# Patient Record
Sex: MALE | Race: WHITE | NOT HISPANIC OR LATINO | Employment: FULL TIME | URBAN - METROPOLITAN AREA
[De-identification: names, ages, dates, MRNs, and addresses within clinical notes are randomized per-mention and may not be internally consistent; named-entity substitution may affect disease eponyms.]

---

## 2018-12-18 ENCOUNTER — HOSPITAL ENCOUNTER (OUTPATIENT)
Dept: ULTRASOUND IMAGING | Facility: HOSPITAL | Age: 38
Discharge: HOME | End: 2018-12-18
Attending: SURGERY | Admitting: SURGERY

## 2018-12-18 ENCOUNTER — TRANSCRIBE ORDERS (OUTPATIENT)
Dept: LAB | Facility: HOSPITAL | Age: 38
End: 2018-12-18

## 2018-12-18 ENCOUNTER — APPOINTMENT (OUTPATIENT)
Dept: LAB | Facility: HOSPITAL | Age: 38
End: 2018-12-18

## 2018-12-18 ENCOUNTER — TRANSCRIBE ORDERS (OUTPATIENT)
Dept: ADMINISTRATIVE | Facility: HOSPITAL | Age: 38
End: 2018-12-18

## 2018-12-18 DIAGNOSIS — R10.11 ABDOMINAL PAIN, RIGHT UPPER QUADRANT: Primary | ICD-10-CM

## 2018-12-18 DIAGNOSIS — R10.11 ABDOMINAL PAIN, RIGHT UPPER QUADRANT: ICD-10-CM

## 2018-12-18 LAB
ALBUMIN SERPL-MCNC: 4.82 G/DL (ref 3.2–4.8)
ALBUMIN/GLOB SERPL: 2.6 G/DL (ref 1.5–2.5)
ALP SERPL-CCNC: 54 U/L (ref 25–100)
ALT SERPL W P-5'-P-CCNC: 89 U/L (ref 7–40)
ANION GAP SERPL CALCULATED.3IONS-SCNC: 8 MMOL/L (ref 3–11)
ARTICHOKE IGE QN: 146 MG/DL (ref 0–130)
AST SERPL-CCNC: 59 U/L (ref 0–33)
BILIRUB SERPL-MCNC: 0.8 MG/DL (ref 0.3–1.2)
BUN BLD-MCNC: 15 MG/DL (ref 9–23)
BUN/CREAT SERPL: 13.6 (ref 7–25)
CALCIUM SPEC-SCNC: 10 MG/DL (ref 8.7–10.4)
CHLORIDE SERPL-SCNC: 100 MMOL/L (ref 99–109)
CHOLEST SERPL-MCNC: 233 MG/DL (ref 0–200)
CO2 SERPL-SCNC: 29 MMOL/L (ref 20–31)
CREAT BLD-MCNC: 1.1 MG/DL (ref 0.6–1.3)
DEPRECATED RDW RBC AUTO: 42.8 FL (ref 37–54)
ERYTHROCYTE [DISTWIDTH] IN BLOOD BY AUTOMATED COUNT: 12.8 % (ref 11.3–14.5)
GFR SERPL CREATININE-BSD FRML MDRD: 74 ML/MIN/1.73
GLOBULIN UR ELPH-MCNC: 1.9 GM/DL
GLUCOSE BLD-MCNC: 86 MG/DL (ref 70–100)
HCT VFR BLD AUTO: 40 % (ref 38.9–50.9)
HDLC SERPL-MCNC: 41 MG/DL (ref 40–60)
HGB BLD-MCNC: 12.9 G/DL (ref 13.1–17.5)
LIPASE SERPL-CCNC: 52 U/L (ref 6–51)
MCH RBC QN AUTO: 29.4 PG (ref 27–31)
MCHC RBC AUTO-ENTMCNC: 32.3 G/DL (ref 32–36)
MCV RBC AUTO: 91.1 FL (ref 80–99)
PLATELET # BLD AUTO: 249 10*3/MM3 (ref 150–450)
PMV BLD AUTO: 11.6 FL (ref 6–12)
POTASSIUM BLD-SCNC: 4.5 MMOL/L (ref 3.5–5.5)
PROT SERPL-MCNC: 6.7 G/DL (ref 5.7–8.2)
RBC # BLD AUTO: 4.39 10*6/MM3 (ref 4.2–5.76)
SODIUM BLD-SCNC: 137 MMOL/L (ref 132–146)
TRIGL SERPL-MCNC: 448 MG/DL (ref 0–150)
WBC NRBC COR # BLD: 5.55 10*3/MM3 (ref 3.5–10.8)

## 2018-12-18 PROCEDURE — 83690 ASSAY OF LIPASE: CPT | Performed by: SURGERY

## 2018-12-18 PROCEDURE — 85027 COMPLETE CBC AUTOMATED: CPT | Performed by: SURGERY

## 2018-12-18 PROCEDURE — 76705 ECHO EXAM OF ABDOMEN: CPT

## 2018-12-18 PROCEDURE — 80053 COMPREHEN METABOLIC PANEL: CPT | Performed by: SURGERY

## 2018-12-18 PROCEDURE — 80061 LIPID PANEL: CPT | Performed by: SURGERY

## 2018-12-18 PROCEDURE — 36415 COLL VENOUS BLD VENIPUNCTURE: CPT | Performed by: SURGERY

## 2018-12-20 ENCOUNTER — TRANSCRIBE ORDERS (OUTPATIENT)
Dept: ADMINISTRATIVE | Facility: HOSPITAL | Age: 38
End: 2018-12-20

## 2018-12-20 DIAGNOSIS — D13.4 LIVER TUMOR (BENIGN): Primary | ICD-10-CM

## 2018-12-28 ENCOUNTER — HOSPITAL ENCOUNTER (OUTPATIENT)
Dept: MRI IMAGING | Facility: HOSPITAL | Age: 38
Discharge: HOME | End: 2018-12-28
Attending: SURGERY | Admitting: SURGERY

## 2018-12-28 DIAGNOSIS — D13.4 LIVER TUMOR (BENIGN): ICD-10-CM

## 2018-12-28 PROCEDURE — 74183 MRI ABD W/O CNTR FLWD CNTR: CPT

## 2018-12-28 PROCEDURE — 0 GADOBENATE DIMEGLUMINE 529 MG/ML SOLUTION: Performed by: SURGERY

## 2018-12-28 PROCEDURE — A9577 INJ MULTIHANCE: HCPCS | Performed by: SURGERY

## 2018-12-28 RX ADMIN — GADOBENATE DIMEGLUMINE 20 ML: 529 INJECTION, SOLUTION INTRAVENOUS at 09:50

## 2019-01-10 RX ORDER — ESCITALOPRAM OXALATE 10 MG/1
TABLET ORAL
Qty: 30 TABLET | Refills: 5 | Status: SHIPPED | OUTPATIENT
Start: 2019-01-10 | End: 2019-08-14 | Stop reason: SDUPTHER

## 2019-02-22 PROBLEM — R73.03 PREDIABETES: Status: ACTIVE | Noted: 2019-02-22

## 2019-02-22 PROBLEM — N18.30 CHRONIC KIDNEY DISEASE, STAGE 3: Status: ACTIVE | Noted: 2019-02-22

## 2019-02-22 PROBLEM — J30.9 AR (ALLERGIC RHINITIS): Status: ACTIVE | Noted: 2019-02-22

## 2019-02-22 PROBLEM — E66.9 OBESITY: Status: ACTIVE | Noted: 2019-02-22

## 2019-02-22 PROBLEM — E78.2 MIXED HYPERLIPIDEMIA: Status: ACTIVE | Noted: 2019-02-22

## 2019-02-22 PROBLEM — R10.9 ABDOMINAL PAIN: Status: ACTIVE | Noted: 2019-02-22

## 2019-02-22 PROBLEM — F41.0 PANIC DISORDER WITHOUT AGORAPHOBIA: Status: ACTIVE | Noted: 2019-02-22

## 2019-02-22 PROBLEM — I10 BENIGN ESSENTIAL HYPERTENSION: Status: ACTIVE | Noted: 2019-02-22

## 2019-02-22 PROBLEM — Z11.1 TUBERCULOSIS SCREENING: Status: ACTIVE | Noted: 2019-02-22

## 2019-02-25 ENCOUNTER — OFFICE VISIT (OUTPATIENT)
Dept: INTERNAL MEDICINE | Facility: CLINIC | Age: 39
End: 2019-02-25

## 2019-02-25 VITALS
HEART RATE: 62 BPM | BODY MASS INDEX: 35.36 KG/M2 | WEIGHT: 247 LBS | DIASTOLIC BLOOD PRESSURE: 80 MMHG | HEIGHT: 70 IN | SYSTOLIC BLOOD PRESSURE: 130 MMHG | TEMPERATURE: 97.7 F

## 2019-02-25 DIAGNOSIS — R06.83 SNORES: Primary | ICD-10-CM

## 2019-02-25 PROCEDURE — 99213 OFFICE O/P EST LOW 20 MIN: CPT | Performed by: PHYSICIAN ASSISTANT

## 2019-02-25 RX ORDER — LOSARTAN POTASSIUM 100 MG/1
100 TABLET ORAL
Refills: 11 | COMMUNITY
Start: 2019-02-07 | End: 2021-12-30 | Stop reason: SDUPTHER

## 2019-02-25 RX ORDER — FENOFIBRATE 54 MG/1
54 TABLET ORAL DAILY
COMMUNITY
Start: 2018-11-07 | End: 2019-06-06 | Stop reason: SDUPTHER

## 2019-02-25 RX ORDER — HEPATITIS A VACCINE, INACTIVATED 50 [IU]/ML
INJECTION, SUSPENSION INTRAMUSCULAR
Refills: 1 | COMMUNITY
Start: 2019-01-08 | End: 2019-05-07

## 2019-02-25 RX ORDER — TAMSULOSIN HYDROCHLORIDE 0.4 MG/1
CAPSULE ORAL
COMMUNITY
End: 2019-05-07

## 2019-02-25 RX ORDER — ENALAPRIL MALEATE 10 MG/1
10 TABLET ORAL DAILY
Refills: 3 | COMMUNITY
Start: 2019-02-07 | End: 2021-12-30 | Stop reason: SDUPTHER

## 2019-02-25 NOTE — PROGRESS NOTES
"Patient Care Team:  Ramana Bautista MD as PCP - General  Ramana Bautista MD as PCP - Family Medicine    Chief Complaint;:   Chief Complaint   Patient presents with   • Snoring     needs to be checked for sleep apnea        Subjective     HPI  38-year-old white male presents to the office today wanting a sleep evaluation.  He has snoring, fatigue.  He rarely wakes up in the morning feeling well rested.  He has gained some weight and is working on weight loss.  Past Medical History:   Diagnosis Date   • Hypertension        Social History     Socioeconomic History   • Marital status:      Spouse name: Not on file   • Number of children: Not on file   • Years of education: Not on file   • Highest education level: Not on file   Social Needs   • Financial resource strain: Not on file   • Food insecurity - worry: Not on file   • Food insecurity - inability: Not on file   • Transportation needs - medical: Not on file   • Transportation needs - non-medical: Not on file   Occupational History   • Not on file   Tobacco Use   • Smoking status: Never Smoker   • Smokeless tobacco: Never Used   Substance and Sexual Activity   • Alcohol use: Yes     Comment: socially   • Drug use: Defer   • Sexual activity: Defer   Other Topics Concern   • Not on file   Social History Narrative   • Not on file       Allergies   Allergen Reactions   • Shellfish-Derived Products Other (See Comments)     Edema       Review of Systems:     Review of Systems   Constitutional: Negative.    Respiratory: Negative.  Negative for apnea.    Cardiovascular: Negative.        Vital Signs  Vitals:    02/25/19 0952   BP: 130/80   BP Location: Left arm   Patient Position: Sitting   Cuff Size: Adult   Pulse: 62   Temp: 97.7 °F (36.5 °C)   TempSrc: Temporal   Weight: 112 kg (247 lb)  Comment: without shoes   Height: 177.8 cm (70\")   PainSc: 0-No pain         Current Outpatient Medications:   •  enalapril (VASOTEC) 10 MG tablet, , Disp: , Rfl: " 3  •  escitalopram (LEXAPRO) 10 MG tablet, TAKE 1 TABLET BY MOUTH EVERY DAY, Disp: 30 tablet, Rfl: 5  •  fenofibrate (TRICOR) 54 MG tablet, Take  by mouth., Disp: , Rfl:   •  losartan (COZAAR) 100 MG tablet, , Disp: , Rfl: 11  •  MULTIPLE VITAMIN PO, Multiple Vitamin capsule  Daily, Disp: , Rfl:   •  tamsulosin (FLOMAX) 0.4 MG capsule 24 hr capsule, tamsulosin 0.4 mg capsule  Take 1 capsule every day by oral route., Disp: , Rfl:   •  VAQTA 50 UNIT/ML injection, , Disp: , Rfl: 1    Physical Exam:    Physical Exam   Constitutional: He is oriented to person, place, and time. He appears well-developed and well-nourished.   Neck: Normal range of motion. Neck supple.   Cardiovascular: Normal rate, regular rhythm and normal heart sounds.   Pulmonary/Chest: Effort normal and breath sounds normal.   Neurological: He is alert and oriented to person, place, and time.   Nursing note and vitals reviewed.      Procedures      Assessment/Plan   Problem List Items Addressed This Visit     None      Visit Diagnoses     Snores    -  Primary    Relevant Orders    Ambulatory Referral to Sleep Medicine        Patient Instructions   Referred to [facility] MD      Plan of care reviewed with patient at the conclusion of today's visit. Education was provided regarding diagnosis, management, and any prescribed or recommended OTC medications.Patient verbalizes understanding of and agreement with management plan.     Mari Shelley PA-C

## 2019-02-25 NOTE — PROGRESS NOTES
Patient Care Team:  Ramana Bautista MD as PCP - General  Ramana Bautista MD as PCP - Family Medicine    Chief Complaint;:   Chief Complaint   Patient presents with   • Snoring     needs to be checked for sleep apnea        Subjective     HPI    Past Medical History:   Diagnosis Date   • Hypertension        History reviewed. No pertinent surgical history.    Family History   Problem Relation Age of Onset   • Stroke Other    • Hypertension Other        Social History     Socioeconomic History   • Marital status:      Spouse name: Not on file   • Number of children: Not on file   • Years of education: Not on file   • Highest education level: Not on file   Social Needs   • Financial resource strain: Not on file   • Food insecurity - worry: Not on file   • Food insecurity - inability: Not on file   • Transportation needs - medical: Not on file   • Transportation needs - non-medical: Not on file   Occupational History   • Not on file   Tobacco Use   • Smoking status: Never Smoker   • Smokeless tobacco: Never Used   Substance and Sexual Activity   • Alcohol use: Yes     Comment: socially   • Drug use: Defer   • Sexual activity: Defer   Other Topics Concern   • Not on file   Social History Narrative   • Not on file       Allergies   Allergen Reactions   • Shellfish-Derived Products Other (See Comments)     Edema       Review of Systems:     Review of Systems   Constitutional: Positive for fatigue. Negative for chills and fever.   HENT: Negative for congestion, ear pain and sinus pressure.    Respiratory: Negative for cough, chest tightness, shortness of breath and wheezing.    Cardiovascular: Negative for chest pain and palpitations.   Gastrointestinal: Negative for abdominal pain, blood in stool and constipation.   Skin: Negative for color change.   Allergic/Immunologic: Negative for environmental allergies.   Neurological: Negative for dizziness, speech difficulty and headache.  "  Psychiatric/Behavioral: Negative for decreased concentration. The patient is not nervous/anxious.        Vital Signs  Vitals:    02/25/19 0952   BP: 130/80   BP Location: Left arm   Patient Position: Sitting   Cuff Size: Adult   Pulse: 62   Temp: 97.7 °F (36.5 °C)   TempSrc: Temporal   Weight: 112 kg (247 lb)  Comment: without shoes   Height: 177.8 cm (70\")   PainSc: 0-No pain         Current Outpatient Medications:   •  enalapril (VASOTEC) 10 MG tablet, , Disp: , Rfl: 3  •  escitalopram (LEXAPRO) 10 MG tablet, TAKE 1 TABLET BY MOUTH EVERY DAY, Disp: 30 tablet, Rfl: 5  •  fenofibrate (TRICOR) 54 MG tablet, Take  by mouth., Disp: , Rfl:   •  losartan (COZAAR) 100 MG tablet, , Disp: , Rfl: 11  •  MULTIPLE VITAMIN PO, Multiple Vitamin capsule  Daily, Disp: , Rfl:   •  tamsulosin (FLOMAX) 0.4 MG capsule 24 hr capsule, tamsulosin 0.4 mg capsule  Take 1 capsule every day by oral route., Disp: , Rfl:   •  VAQTA 50 UNIT/ML injection, , Disp: , Rfl: 1    Physical Exam:    Physical Exam    Procedures     Results Review:    I reviewed the patient's new clinical results.    Assessment/Plan   Problem List Items Addressed This Visit     None        There are no Patient Instructions on file for this visit.    Plan of care reviewed with patient at the conclusion of today's visit. Education was provided regarding diagnosis, management, and any prescribed or recommended OTC medications.Patient verbalizes understanding of and agreement with management plan.     Emma Levi MA  "

## 2019-02-27 ENCOUNTER — CONSULT (OUTPATIENT)
Dept: SLEEP MEDICINE | Facility: HOSPITAL | Age: 39
End: 2019-02-27

## 2019-02-27 VITALS
DIASTOLIC BLOOD PRESSURE: 83 MMHG | OXYGEN SATURATION: 97 % | HEART RATE: 74 BPM | WEIGHT: 249.2 LBS | BODY MASS INDEX: 35.68 KG/M2 | HEIGHT: 70 IN | SYSTOLIC BLOOD PRESSURE: 154 MMHG

## 2019-02-27 DIAGNOSIS — R06.83 SNORING: Primary | ICD-10-CM

## 2019-02-27 DIAGNOSIS — E66.09 CLASS 2 OBESITY DUE TO EXCESS CALORIES WITHOUT SERIOUS COMORBIDITY WITH BODY MASS INDEX (BMI) OF 35.0 TO 35.9 IN ADULT: ICD-10-CM

## 2019-02-27 DIAGNOSIS — G47.33 OBSTRUCTIVE SLEEP APNEA, ADULT: ICD-10-CM

## 2019-02-27 PROCEDURE — 99203 OFFICE O/P NEW LOW 30 MIN: CPT | Performed by: INTERNAL MEDICINE

## 2019-02-28 NOTE — PROGRESS NOTES
Subjective   Fili Mcclellan is a 38 y.o. male is being seen for consultation today at the request of Mari Shelley PA-C for the evaluation of snoring and restorative sleep.  He is also seen by Dr. Steve Bautista    History of Present Illness  Patient states she had a history of snoring and is gained weight recently.  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  He is referred here for further evaluation.  He has a history of snoring loudly for about 10 years.  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  He is been on blood pressure medicines for many years.  He is not been noted to have apneas.  He rarely awakens gasping for breath.  He says he sometimes rested in the mornings although occasionally not.  He denies any morning headaches.  He sleepy if sitting quietly during the day.  He gets sleepy driving.  He's been told he had a deviated septum and has broken his nose playing sports.    He says he usually sleeps on his stomach and doesn't feel like he can sleep on his back.  He does have trouble breathing through his nose both day and night.  He denies any reflux symptoms he denies hypnagogic hallucinations sleep paralysis.  He denies kicking or jerking his legs at night.  He denies chronic pain.  He is gained about 10 pounds in the past year.    He goes to bed about 9:30 and will fall asleep within 20 minutes.  He awakens 3-4 times during the night.  He thinks he gets 7-8 hours of sleep arising at 6:30 in the morning.  He is sometimes rested although sometimes not.  He has a history of hypertension known since college.  He denies any history of diabetes coronary artery disease.  He has a history of prostatitis.  Allergies   Allergen Reactions   • Shellfish-Derived Products Other (See Comments)     Edema          Current Outpatient Medications:   •  enalapril (VASOTEC) 10 MG tablet,  ", Disp: , Rfl: 3  •  escitalopram (LEXAPRO) 10 MG tablet, TAKE 1 TABLET BY MOUTH EVERY DAY, Disp: 30 tablet, Rfl: 5  •  fenofibrate (TRICOR) 54 MG tablet, Take  by mouth., Disp: , Rfl:   •  losartan (COZAAR) 100 MG tablet, , Disp: , Rfl: 11  •  tamsulosin (FLOMAX) 0.4 MG capsule 24 hr capsule, tamsulosin 0.4 mg capsule  Take 1 capsule every day by oral route., Disp: , Rfl:   •  MULTIPLE VITAMIN PO, Multiple Vitamin capsule  Daily, Disp: , Rfl:   •  VAQTA 50 UNIT/ML injection, , Disp: , Rfl: 1    Social History    Tobacco Use      Smoking status: Never Smoker      Smokeless tobacco: Never Used       Social History     Substance and Sexual Activity   Alcohol Use He estimates 2-3 drinks per day     Comment: socially       Caffeine: He has 2 cups coffee per day    Past Medical History:   Diagnosis Date   • Hypertension        Past Surgical History:   Procedure Laterality Date   • HERNIA REPAIR         Family History   Problem Relation Age of Onset   • Stroke Other    • Hypertension Other    • Breast cancer Mother    • Heart disease Father    • Hypertension Father        The following portions of the patient's history were reviewed and updated as appropriate: allergies, current medications, past family history, past medical history, past social history, past surgical history and problem list.    Review of Systems   Constitutional: Negative.    HENT: Negative.    Eyes: Negative.    Respiratory: Negative.    Cardiovascular: Negative.    Gastrointestinal: Negative.    Endocrine: Negative.    Genitourinary: Positive for frequency.   Musculoskeletal: Positive for back pain.   Skin: Negative.    Allergic/Immunologic: Positive for food allergies.   Neurological: Positive for dizziness and light-headedness.   Hematological: Negative.    Psychiatric/Behavioral: The patient is nervous/anxious.     [place] score 0/24    Objective     /83   Pulse 74   Ht 177.8 cm (70\")   Wt 113 kg (249 lb 3.2 oz)   SpO2 97%   BMI 35.76 " kg/m²      Physical Exam   Constitutional: He is oriented to person, place, and time. He appears well-developed and well-nourished.   He is obese.   HENT:   Head: Normocephalic and atraumatic.   He has nasal septal deviation the left and Mallampati class to anatomy.   Eyes: EOM are normal. Pupils are equal, round, and reactive to light.   Neck: Normal range of motion. Neck supple.   Cardiovascular: Normal rate, regular rhythm and normal heart sounds.   Pulmonary/Chest: Effort normal and breath sounds normal.   Abdominal: Soft. Bowel sounds are normal.   Musculoskeletal: Normal range of motion. He exhibits no edema.   Neurological: He is alert and oriented to person, place, and time.   Skin: Skin is warm and dry.   Psychiatric: He has a normal mood and affect. His behavior is normal.         Assessment/Plan   Fili was seen today for sleeping problem.    Diagnoses and all orders for this visit:    Snoring  -     Home Sleep Study; Future    Obstructive sleep apnea, adult  -     Home Sleep Study; Future    Class 2 obesity due to excess calories without serious comorbidity with body mass index (BMI) of 35.0 to 35.9 in adult     patient is a history of snoring and nonrestorative sleep.  I think is an excellent story for obstructive sleep apnea.  We will plan to proceed to home sleep testing.  I've discussed possible therapies including CPAP, weight control, oral appliances, and surgery.  We have also discussed long-term consequences of untreated obstructive sleep apnea.  He is encouraged to lose weight.  He is encouraged to avoid alcohol and sedatives close to bedtime.  He is encouraged practice lateral position sleep.         Vivek Canela MD [facility]  Sleep Medicine  Pulmonary and Critical Care Medicine

## 2019-03-01 ENCOUNTER — HOSPITAL ENCOUNTER (OUTPATIENT)
Dept: SLEEP MEDICINE | Facility: HOSPITAL | Age: 39
Discharge: HOME | End: 2019-03-01
Admitting: INTERNAL MEDICINE

## 2019-03-01 VITALS
BODY MASS INDEX: 35.65 KG/M2 | DIASTOLIC BLOOD PRESSURE: 76 MMHG | OXYGEN SATURATION: 95 % | HEART RATE: 72 BPM | HEIGHT: 70 IN | SYSTOLIC BLOOD PRESSURE: 147 MMHG | WEIGHT: 249 LBS

## 2019-03-01 DIAGNOSIS — R06.83 SNORING: ICD-10-CM

## 2019-03-01 DIAGNOSIS — G47.33 OBSTRUCTIVE SLEEP APNEA, ADULT: ICD-10-CM

## 2019-03-01 PROCEDURE — 95800 SLP STDY UNATTENDED: CPT | Performed by: INTERNAL MEDICINE

## 2019-03-01 PROCEDURE — 95800 SLP STDY UNATTENDED: CPT

## 2019-03-05 DIAGNOSIS — G47.33 OSA (OBSTRUCTIVE SLEEP APNEA): Primary | ICD-10-CM

## 2019-03-05 DIAGNOSIS — R06.83 SNORING: ICD-10-CM

## 2019-03-05 DIAGNOSIS — J30.9 ALLERGIC RHINITIS, UNSPECIFIED SEASONALITY, UNSPECIFIED TRIGGER: ICD-10-CM

## 2019-03-05 DIAGNOSIS — I10 BENIGN ESSENTIAL HYPERTENSION: ICD-10-CM

## 2019-03-12 ENCOUNTER — OFFICE VISIT (OUTPATIENT)
Dept: SLEEP MEDICINE | Facility: HOSPITAL | Age: 39
End: 2019-03-12

## 2019-03-12 VITALS
BODY MASS INDEX: 35.3 KG/M2 | WEIGHT: 246.6 LBS | SYSTOLIC BLOOD PRESSURE: 161 MMHG | HEART RATE: 67 BPM | DIASTOLIC BLOOD PRESSURE: 84 MMHG | OXYGEN SATURATION: 97 % | HEIGHT: 70 IN

## 2019-03-12 DIAGNOSIS — G47.33 OBSTRUCTIVE SLEEP APNEA, ADULT: Primary | ICD-10-CM

## 2019-03-12 DIAGNOSIS — G47.34 NOCTURNAL HYPOXEMIA: ICD-10-CM

## 2019-03-12 PROCEDURE — 99213 OFFICE O/P EST LOW 20 MIN: CPT | Performed by: NURSE PRACTITIONER

## 2019-03-12 NOTE — PROGRESS NOTES
Subjective: Follow-up        Chief Complaint:   Chief Complaint   Patient presents with   • Follow-up       HPI:    Fili Mcclellan is a 38 y.o. male here for follow-up of study results.  Patient was seen here in consult 2/27/2019 by Dr. Vivek Canela.  Patient had a history of snoring, weight gain, sleepy while driving and intermittently feeling rested.  Patient did have a sleep study on 3/3/2019 that showed severe obstructive sleep apnea as well as nocturnal hypoxemia.  Patient has a history of hypertension, obesity, chronic kidney disease stage III, prediabetes, and panic disorder.  Patient is sleeping 7-8 hours nightly and intermittently feels refreshed upon awakening.  Patient [place] score is 0/24.  Patient understands the consequences of untreated sleep apnea and wishes to initiate CPAP therapy.        Current medications are:   Current Outpatient Medications:   •  enalapril (VASOTEC) 10 MG tablet, , Disp: , Rfl: 3  •  escitalopram (LEXAPRO) 10 MG tablet, TAKE 1 TABLET BY MOUTH EVERY DAY, Disp: 30 tablet, Rfl: 5  •  fenofibrate (TRICOR) 54 MG tablet, Take  by mouth., Disp: , Rfl:   •  losartan (COZAAR) 100 MG tablet, , Disp: , Rfl: 11  •  tamsulosin (FLOMAX) 0.4 MG capsule 24 hr capsule, tamsulosin 0.4 mg capsule  Take 1 capsule every day by oral route., Disp: , Rfl:   •  MULTIPLE VITAMIN PO, Multiple Vitamin capsule  Daily, Disp: , Rfl:   •  VAQTA 50 UNIT/ML injection, , Disp: , Rfl: 1.      The patient's relevant past medical, surgical, family and social history were reviewed and updated in Epic as appropriate.       Review of Systems   Constitutional: Positive for fatigue.   Respiratory: Positive for apnea.    Genitourinary: Positive for frequency.   Musculoskeletal: Positive for back pain.   Allergic/Immunologic: Positive for food allergies.   Neurological: Positive for dizziness and light-headedness.   Psychiatric/Behavioral: Positive for sleep disturbance. The patient is nervous/anxious.    All other  systems reviewed and are negative.        Objective:    Physical Exam   Constitutional: He is oriented to person, place, and time. He appears well-developed and well-nourished.   HENT:   Head: Normocephalic and atraumatic.   Mouth/Throat: Oropharynx is clear and moist.   Mallampati 2 anatomy   Eyes: Conjunctivae are normal.   Neck: Neck supple. No thyromegaly present.   Cardiovascular: Normal rate and regular rhythm.   Pulmonary/Chest: Effort normal and breath sounds normal.   Lymphadenopathy:     He has no cervical adenopathy.   Neurological: He is alert and oriented to person, place, and time.   Skin: Skin is warm and dry.   Psychiatric: He has a normal mood and affect. His behavior is normal. Judgment and thought content normal.   Nursing note and vitals reviewed.        ASSESSMENT/PLAN    Fili was seen today for follow-up.    Diagnoses and all orders for this visit:    Obstructive sleep apnea, adult    Nocturnal hypoxemia            1. Counseled patient regarding multimodal approach with healthy nutrition, healthy sleep, regular physical activity, social activities, counseling, and medications. Encouraged to practice lateral sleep position. Avoid alcohol and sedatives close to bedtime.  2.   Ordered to be faxed to DME of patient's choosing.  I will see patient back in 31-90 days to reassess and download compliance.  At his next visit I will order an overnight oximetry while wearing CPAP to reassess his oxygen levels.  I have reviewed the results of my evaluation and impression and discussed my recommendations in detail with the patient.      Signed by  ROBERTO CARLOS Corona    March 12, 2019      CC: Ramana Bautista MD          No ref. provider found

## 2019-03-12 NOTE — PATIENT INSTRUCTIONS
Hypoxemia  Hypoxemia occurs when the blood does not contain enough oxygen. The body cannot work well when it does not have enough oxygen because every part of the body needs oxygen. Oxygen enters the lungs when we breathe in, then it travels to all parts of the body through the blood. Hypoxemia can develop suddenly or slowly.  What are the causes?  Common causes of this condition include:  · Long-term (chronic) lung diseases, such as chronic obstructive pulmonary disease (COPD) or interstitial lung disease.  · Disorders that affect breathing at night, such as sleep apnea.  · Fluid buildup in the lungs (pulmonary edema).  · Lung infection (pneumonia).  · Lung or throat cancer.  · Abnormal blood flow that bypasses the lungs (having a shunt).  · Certain diseases that affect nerves or muscles.  · A collapsed lung (pneumothorax).  · A blood clot in the lungs (pulmonary embolus).  · Certain types of heart disease.  · Slow or shallow breathing (hypoventilation).  · Certain medicines.  · High altitudes.  · Toxic chemicals, smoke, and gases.    What are the signs or symptoms?  In some cases, there may be no symptoms of this condition. If you do have symptoms, they may include:  · Shortness of breath (dyspnea).  · Bluish color of the skin, lips, or nail beds.  · Breathing that is fast, noisy, or shallow.  · A fast heartbeat.  · Feeling tired or sleepy.  · Feeling confused or worried.    If hypoxemia develops quickly, you will likely have dyspnea. If hypoxemia develops slowly over months or years, you may not notice any symptoms.  How is this diagnosed?  This condition is diagnosed by:  · A physical exam.  · Blood tests.  · A test that measures the percentage of oxygen in your blood (pulse oximetry). This is done with a sensor that is placed on your finger, toe, or earlobe.    How is this treated?  Treatment for this condition depends on the underlying cause of your hypoxemia. You will likely be treated with oxygen therapy to  restore your blood oxygen level. Depending on the cause of your hypoxemia, you may need oxygen therapy for a short time (weeks or months), or you may need it for the rest of your life.  Your health care provider may also recommend other therapies to treat the underlying cause of your hypoxemia.  Follow these instructions at home:  · Take over-the-counter and prescription medicines only as told by your health care provider.  · If you are on oxygen therapy, follow oxygen safety precautions as directed by your health care provider. These may include:  ? Always having a backup supply of oxygen.  ? Not allowing anyone to smoke or have a fire around oxygen.  ? Handling oxygen tanks carefully and as instructed.  · Do not use any products that contain nicotine or tobacco, such as cigarettes and e-cigarettes. If you need help quitting, ask your health care provider. Stay away from people who smoke.  · Keep all follow-up visits as told by your health care provider. This is important.  Contact a health care provider if:  · You have any concerns about your oxygen therapy.  · You have trouble breathing, even during or after treatment.  · You become short of breath when you exercise.  · You are tired when you wake up.  · You have a headache when you wake up.  Get help right away if:  · Your shortness of breath gets worse, especially with normal or minimal activity.  · You have a bluish color of the skin, lips, or nail beds.  · You become confused or you cannot think properly.  · You cough up dark mucus or blood.  · You have chest pain.  · You have a fever.  Summary  · Hypoxemia occurs when the blood does not contain enough oxygen.  · Hypoxemia may or may not cause symptoms. Often, the main symptom is shortness of breath (dyspnea).  · Depending on the cause of your hypoxemia, you may need oxygen therapy for a short time (weeks or months), or you may need it for the rest of your life.  · If you are on oxygen therapy, follow oxygen  safety precautions as directed by your health care provider.  This information is not intended to replace advice given to you by your health care provider. Make sure you discuss any questions you have with your health care provider.  Document Released: 07/02/2012 Document Revised: 11/21/2017 Document Reviewed: 11/21/2017  [employer] Interactive Patient Education © 2019 [employer]  Sleep Apnea  Sleep apnea is a condition that affects breathing. People with sleep apnea have moments during sleep when their breathing pauses briefly or gets shallow. Sleep apnea can cause these symptoms:  · Trouble staying asleep.  · Sleepiness or tiredness during the day.  · Irritability.  · Loud snoring.  · Morning headaches.  · Trouble concentrating.  · Forgetting things.  · Less interest in sex.  · Being sleepy for no reason.  · Mood swings.  · Personality changes.  · Depression.  · Waking up a lot during the night to pee (urinate).  · Dry mouth.  · Sore throat.    Follow these instructions at home:  · Make any changes in your routine that your doctor recommends.  · Eat a healthy, well-balanced diet.  · Take over-the-counter and prescription medicines only as told by your doctor.  · Avoid using alcohol, calming medicines (sedatives), and narcotic medicines.  · Take steps to lose weight if you are overweight.  · If you were given a machine (device) to use while you sleep, use it only as told by your doctor.  · Do not use any tobacco products, such as cigarettes, chewing tobacco, and e-cigarettes. If you need help quitting, ask your doctor.  · Keep all follow-up visits as told by your doctor. This is important.  Contact a doctor if:  · The machine that you were given to use during sleep is uncomfortable or does not seem to be working.  · Your symptoms do not get better.  · Your symptoms get worse.  Get help right away if:  · Your chest hurts.  · You have trouble breathing in enough air (shortness of breath).  · You have an  uncomfortable feeling in your back, arms, or stomach.  · You have trouble talking.  · One side of your body feels weak.  · A part of your face is hanging down (drooping).  These symptoms may be an emergency. Do not wait to see if the symptoms will go away. Get medical help right away. Call your local emergency services (911 in the [place] Do not drive yourself to the hospital.  This information is not intended to replace advice given to you by your health care provider. Make sure you discuss any questions you have with your health care provider.  Document Released: 09/26/2009 Document Revised: 07/16/2018 Document Reviewed: 09/26/2016  [employer] Interactive Patient Education © 2019 [employer]

## 2019-05-07 ENCOUNTER — OFFICE VISIT (OUTPATIENT)
Dept: INTERNAL MEDICINE | Facility: CLINIC | Age: 39
End: 2019-05-07

## 2019-05-07 ENCOUNTER — TELEPHONE (OUTPATIENT)
Dept: INTERNAL MEDICINE | Facility: CLINIC | Age: 39
End: 2019-05-07

## 2019-05-07 VITALS
SYSTOLIC BLOOD PRESSURE: 122 MMHG | WEIGHT: 243 LBS | TEMPERATURE: 98.1 F | OXYGEN SATURATION: 98 % | HEART RATE: 64 BPM | DIASTOLIC BLOOD PRESSURE: 72 MMHG | HEIGHT: 70 IN | BODY MASS INDEX: 34.79 KG/M2

## 2019-05-07 DIAGNOSIS — J40 BRONCHITIS: Primary | ICD-10-CM

## 2019-05-07 PROCEDURE — 96372 THER/PROPH/DIAG INJ SC/IM: CPT | Performed by: PHYSICIAN ASSISTANT

## 2019-05-07 PROCEDURE — 99213 OFFICE O/P EST LOW 20 MIN: CPT | Performed by: PHYSICIAN ASSISTANT

## 2019-05-07 RX ORDER — DOXYCYCLINE 100 MG/1
100 TABLET ORAL 2 TIMES DAILY
Qty: 14 TABLET | Refills: 0 | Status: SHIPPED | OUTPATIENT
Start: 2019-05-07 | End: 2019-05-30

## 2019-05-07 RX ORDER — TRIAMCINOLONE ACETONIDE 40 MG/ML
80 INJECTION, SUSPENSION INTRA-ARTICULAR; INTRAMUSCULAR ONCE
Status: COMPLETED | OUTPATIENT
Start: 2019-05-07 | End: 2019-05-07

## 2019-05-07 RX ORDER — METHYLPREDNISOLONE 4 MG/1
TABLET ORAL
Qty: 21 EACH | Refills: 0 | Status: SHIPPED | OUTPATIENT
Start: 2019-05-07 | End: 2019-05-30

## 2019-05-07 RX ADMIN — TRIAMCINOLONE ACETONIDE 80 MG: 40 INJECTION, SUSPENSION INTRA-ARTICULAR; INTRAMUSCULAR at 11:22

## 2019-05-07 NOTE — TELEPHONE ENCOUNTER
Received fax from [facility] pharmacy stating doxycycline monohydrate is not in stock and asked if it can be switched to doxycycline hyclate. Per verbal from Summer alejandre to switch. Called [facility] and spoke with Ivy who verbalized understanding.

## 2019-05-07 NOTE — TELEPHONE ENCOUNTER
Keerthi from the pharmacy called stating they don't have the  doxycycline monohydrate in stock     Wants to know if they can change it to doxycycline hyclate capsules

## 2019-05-07 NOTE — PROGRESS NOTES
Patient Care Team:  Ramana Bautista MD as PCP - General  Ramana Bautista MD as PCP - Family Medicine    Chief Complaint::   Chief Complaint   Patient presents with   • URI     cough, congestion, wheezing         Subjective     HPI  Pt has started CPAP machine.       Cough   This is a new problem. The current episode started in the past 7 days. The problem has been unchanged. The cough is productive of sputum. Associated symptoms include headaches and wheezing. Pertinent negatives include no chest pain, chills, ear pain, fever or shortness of breath. Exacerbated by: worse in morning. He has tried OTC cough suppressant for the symptoms. The treatment provided no relief. His past medical history is significant for environmental allergies.       PMH  The following portions of the patient's history were reviewed and updated as appropriate: allergies, current medications, past family history, past medical history, past social history, past surgical history and problem list.    Review of Systems:   Review of Systems   Constitutional: Negative for chills, fatigue and fever.   HENT: Positive for congestion. Negative for ear pain and sinus pressure.    Eyes: Negative for blurred vision and double vision.   Respiratory: Positive for cough, chest tightness and wheezing. Negative for shortness of breath.    Cardiovascular: Negative for chest pain and palpitations.   Gastrointestinal: Negative for abdominal pain, blood in stool and constipation.   Endocrine: Negative for cold intolerance and heat intolerance.   Musculoskeletal: Positive for back pain.   Skin: Negative for color change and dry skin.   Allergic/Immunologic: Positive for environmental allergies.   Neurological: Negative for dizziness, speech difficulty and headache.   Psychiatric/Behavioral: Negative for decreased concentration. The patient is not nervous/anxious.        Vital Signs  Vitals:    05/07/19 1047   BP: 122/72   BP Location: Left arm  "  Patient Position: Sitting   Cuff Size: Large Adult   Pulse: 64   Temp: 98.1 °F (36.7 °C)   TempSrc: Temporal   SpO2: 98%   Weight: 110 kg (243 lb)   Height: 177.8 cm (70\")   PainSc: 0-No pain       Labs  No visits with results within 3 Month(s) from this visit.   Latest known visit with results is:   Transcribe Orders on 12/18/2018   Component Date Value Ref Range Status   • Glucose 12/18/2018 86  70 - 100 mg/dL Final   • BUN 12/18/2018 15  9 - 23 mg/dL Final   • Creatinine 12/18/2018 1.10  0.60 - 1.30 mg/dL Final   • Sodium 12/18/2018 137  132 - 146 mmol/L Final   • Potassium 12/18/2018 4.5  3.5 - 5.5 mmol/L Final   • Chloride 12/18/2018 100  99 - 109 mmol/L Final   • CO2 12/18/2018 29.0  20.0 - 31.0 mmol/L Final   • Calcium 12/18/2018 10.0  8.7 - 10.4 mg/dL Final   • Total Protein 12/18/2018 6.7  5.7 - 8.2 g/dL Final   • Albumin 12/18/2018 4.82* 3.20 - 4.80 g/dL Final   • ALT (SGPT) 12/18/2018 89* 7 - 40 U/L Final   • AST (SGOT) 12/18/2018 59* 0 - 33 U/L Final   • Alkaline Phosphatase 12/18/2018 54  25 - 100 U/L Final   • Total Bilirubin 12/18/2018 0.8  0.3 - 1.2 mg/dL Final   • eGFR Non  Amer 12/18/2018 74  >60 mL/min/1.73 Final   • Globulin 12/18/2018 1.9  gm/dL Final   • A/G Ratio 12/18/2018 2.6* 1.5 - 2.5 g/dL Final   • BUN/Creatinine Ratio 12/18/2018 13.6  7.0 - 25.0 Final   • Anion Gap 12/18/2018 8.0  3.0 - 11.0 mmol/L Final   • WBC 12/18/2018 5.55  3.50 - 10.80 10*3/mm3 Final   • RBC 12/18/2018 4.39  4.20 - 5.76 10*6/mm3 Final   • Hemoglobin 12/18/2018 12.9* 13.1 - 17.5 g/dL Final   • Hematocrit 12/18/2018 40.0  38.9 - 50.9 % Final   • MCV 12/18/2018 91.1  80.0 - 99.0 fL Final   • MCH 12/18/2018 29.4  27.0 - 31.0 pg Final   • MCHC 12/18/2018 32.3  32.0 - 36.0 g/dL Final   • RDW 12/18/2018 12.8  11.3 - 14.5 % Final   • RDW-SD 12/18/2018 42.8  37.0 - 54.0 fl Final   • MPV 12/18/2018 11.6  6.0 - 12.0 fL Final   • Platelets 12/18/2018 249  150 - 450 10*3/mm3 Final   • Lipase 12/18/2018 52* 6 - 51 U/L " Final   • Total Cholesterol 12/18/2018 233* 0 - 200 mg/dL Final   • Triglycerides 12/18/2018 448* 0 - 150 mg/dL Final   • HDL Cholesterol 12/18/2018 41  40 - 60 mg/dL Final   • LDL Cholesterol  12/18/2018 146* 0 - 130 mg/dL Final       Imaging  All imaging past 24 hours:   Imaging Results (last 24 hours)     ** No results found for the last 24 hours. **            Current Outpatient Medications:   •  enalapril (VASOTEC) 10 MG tablet, Take 10 mg by mouth Daily., Disp: , Rfl: 3  •  escitalopram (LEXAPRO) 10 MG tablet, TAKE 1 TABLET BY MOUTH EVERY DAY, Disp: 30 tablet, Rfl: 5  •  fenofibrate (TRICOR) 54 MG tablet, Take 54 mg by mouth Daily., Disp: , Rfl:   •  losartan (COZAAR) 100 MG tablet, Take 100 mg by mouth., Disp: , Rfl: 11  •  doxycycline (ADOXA) 100 MG tablet, Take 1 tablet by mouth 2 (Two) Times a Day., Disp: 14 tablet, Rfl: 0  •  methylPREDNISolone (MEDROL, LC,) 4 MG tablet, Take as directed on package instructions., Disp: 21 each, Rfl: 0    Current Facility-Administered Medications:   •  triamcinolone acetonide (KENALOG-40) injection 80 mg, 80 mg, Intramuscular, Once, Summer Sexton PA-C    Physical Exam:    Physical Exam   Constitutional: He is oriented to person, place, and time. He appears well-developed and well-nourished. No distress.   HENT:   Head: Normocephalic and atraumatic.   Nose: Nose normal.   Mouth/Throat: Oropharynx is clear and moist.   Eyes: Conjunctivae and EOM are normal. Pupils are equal, round, and reactive to light.   Neck: Neck supple. No JVD present.   Cardiovascular: Normal rate, regular rhythm and normal heart sounds.   No murmur heard.  Pulmonary/Chest: Effort normal. No stridor. No respiratory distress. He has wheezes.   Musculoskeletal: He exhibits no edema.   Lymphadenopathy:     He has no cervical adenopathy.   Neurological: He is alert and oriented to person, place, and time.   Skin: Skin is warm and dry. He is not diaphoretic.   Psychiatric: He has a normal mood and  affect. His behavior is normal. Judgment and thought content normal.   Nursing note and vitals reviewed.      Procedures        Assessment/Plan   Problem List Items Addressed This Visit     None      Visit Diagnoses     Bronchitis    -  Primary    Increase water intake, mucinex twice daily    Relevant Medications    triamcinolone acetonide (KENALOG-40) injection 80 mg (Start on 5/7/2019 12:00 PM)    methylPREDNISolone (MEDROL, LC,) 4 MG tablet    doxycycline (ADOXA) 100 MG tablet          Return if symptoms worsen or fail to improve.    Plan of care reviewed with patient at the conclusion of today's visit. Education was provided regarding diagnosis, management, and any prescribed or recommended OTC medications.Patient verbalizes understanding of and agreement with management plan.     Summer Sexton PA-C

## 2019-05-08 NOTE — TELEPHONE ENCOUNTER
I called pharmacy back and spoke to ana she took a verbal over the phone that it was ok to change the rx to the doxycycline hyclate 100 mg capsules

## 2019-05-14 ENCOUNTER — OFFICE VISIT (OUTPATIENT)
Dept: SLEEP MEDICINE | Facility: HOSPITAL | Age: 39
End: 2019-05-14

## 2019-05-14 VITALS
HEIGHT: 70 IN | WEIGHT: 241.8 LBS | OXYGEN SATURATION: 97 % | SYSTOLIC BLOOD PRESSURE: 143 MMHG | HEART RATE: 82 BPM | BODY MASS INDEX: 34.62 KG/M2 | DIASTOLIC BLOOD PRESSURE: 83 MMHG

## 2019-05-14 DIAGNOSIS — G47.33 OBSTRUCTIVE SLEEP APNEA, ADULT: Primary | ICD-10-CM

## 2019-05-14 DIAGNOSIS — G47.34 NOCTURNAL HYPOXEMIA: ICD-10-CM

## 2019-05-14 PROCEDURE — 99213 OFFICE O/P EST LOW 20 MIN: CPT | Performed by: NURSE PRACTITIONER

## 2019-05-14 NOTE — PROGRESS NOTES
"Subjective:   Follow-up      Chief Complaint:   Chief Complaint   Patient presents with   • Follow-up       HPI:    Fili Mcclellan is a 38 y.o. male here for follow-up of obstructive sleep apnea.  Patient was last seen 3/12/2019 and did decide to initiate CPAP therapy.  His sleep study did show severe obstructive sleep apnea as well as nocturnal hypoxemia.  Patient is sleeping 8 to 9 hours nightly and feels very refreshed upon awakening.  Patient has an [place] score of 2/24.  Patient states \"I love this thing I will never go without it again.\"  Patient is doing well with current settings.  Patient has no concerns or complaints today and wishes to continue CPAP therapy.  Patient has been cleaning daily but is going to buy a so clean to help save time for him.        Current medications are:   Current Outpatient Medications:   •  doxycycline (ADOXA) 100 MG tablet, Take 1 tablet by mouth 2 (Two) Times a Day., Disp: 14 tablet, Rfl: 0  •  enalapril (VASOTEC) 10 MG tablet, Take 10 mg by mouth Daily., Disp: , Rfl: 3  •  escitalopram (LEXAPRO) 10 MG tablet, TAKE 1 TABLET BY MOUTH EVERY DAY, Disp: 30 tablet, Rfl: 5  •  fenofibrate (TRICOR) 54 MG tablet, Take 54 mg by mouth Daily., Disp: , Rfl:   •  losartan (COZAAR) 100 MG tablet, Take 100 mg by mouth., Disp: , Rfl: 11  •  methylPREDNISolone (MEDROL, LC,) 4 MG tablet, Take as directed on package instructions., Disp: 21 each, Rfl: 0.      The patient's relevant past medical, surgical, family and social history were reviewed and updated in Epic as appropriate.       Review of Systems   Respiratory: Positive for apnea.    Genitourinary: Positive for frequency.   Musculoskeletal: Positive for back pain.   Allergic/Immunologic: Positive for food allergies.   Neurological: Positive for dizziness and light-headedness.   Psychiatric/Behavioral: Positive for sleep disturbance. The patient is nervous/anxious.    All other systems reviewed and are " negative.        Objective:    Physical Exam   Constitutional: He is oriented to person, place, and time. He appears well-developed and well-nourished.   HENT:   Head: Normocephalic and atraumatic.   Mouth/Throat: Oropharynx is clear and moist.   Mallampati 2 anatomy   Eyes: Conjunctivae are normal.   Neck: Neck supple.   Cardiovascular: Normal rate and regular rhythm.   Pulmonary/Chest: Effort normal and breath sounds normal.   Neurological: He is alert and oriented to person, place, and time.   Skin: Skin is warm and dry.   Psychiatric: He has a normal mood and affect. His behavior is normal. Judgment and thought content normal.   Nursing note and vitals reviewed.  62/60 2 days of use.  Greater than 4-hour use 100%.  90% pressure 9.7.  AHI 3.2.  Download reviewed with patient.      ASSESSMENT/PLAN    Fili was seen today for follow-up.    Diagnoses and all orders for this visit:    Obstructive sleep apnea, adult  -     CPAP Therapy  -     Overnight Sleep Oximetry Study; Future    Nocturnal hypoxemia  -     Overnight Sleep Oximetry Study; Future            1. Counseled patient regarding multimodal approach with healthy nutrition, healthy sleep, regular physical activity, social activities, counseling, and medications. Encouraged to practice lateral sleep position. Avoid alcohol and sedatives close to bedtime.  2. Refill supplies x1 year.  Overnight oximetry ordered while wearing CPAP to reassess nocturnal hypoxemia.  Patient will be called with these results.  Patient to return to clinic in 1 year or sooner if symptoms warrant.    I have reviewed the results of my evaluation and impression and discussed my recommendations in detail with the patient.      Signed by  Yanely Tapia, ROBERTO CARLOS    May 14, 2019      CC: Ramana Bautista MD          No ref. provider found

## 2019-05-30 ENCOUNTER — OFFICE VISIT (OUTPATIENT)
Dept: INTERNAL MEDICINE | Facility: CLINIC | Age: 39
End: 2019-05-30

## 2019-05-30 ENCOUNTER — LAB (OUTPATIENT)
Dept: LAB | Facility: HOSPITAL | Age: 39
End: 2019-05-30

## 2019-05-30 VITALS
HEART RATE: 76 BPM | BODY MASS INDEX: 33.93 KG/M2 | TEMPERATURE: 98.7 F | WEIGHT: 237 LBS | SYSTOLIC BLOOD PRESSURE: 124 MMHG | DIASTOLIC BLOOD PRESSURE: 78 MMHG | HEIGHT: 70 IN

## 2019-05-30 DIAGNOSIS — R73.03 PREDIABETES: ICD-10-CM

## 2019-05-30 DIAGNOSIS — G47.33 OSA (OBSTRUCTIVE SLEEP APNEA): ICD-10-CM

## 2019-05-30 DIAGNOSIS — R42 DIZZINESS: Primary | ICD-10-CM

## 2019-05-30 DIAGNOSIS — R42 DIZZINESS: ICD-10-CM

## 2019-05-30 DIAGNOSIS — I10 BENIGN ESSENTIAL HYPERTENSION: ICD-10-CM

## 2019-05-30 DIAGNOSIS — N18.30 CHRONIC KIDNEY DISEASE, STAGE 3 (HCC): ICD-10-CM

## 2019-05-30 LAB
ALBUMIN SERPL-MCNC: 4.9 G/DL (ref 3.5–5.2)
ALBUMIN/GLOB SERPL: 2 G/DL
ALP SERPL-CCNC: 53 U/L (ref 39–117)
ALT SERPL W P-5'-P-CCNC: 57 U/L (ref 1–41)
ANION GAP SERPL CALCULATED.3IONS-SCNC: 13.8 MMOL/L
AST SERPL-CCNC: 43 U/L (ref 1–40)
BASOPHILS # BLD AUTO: 0.03 10*3/MM3 (ref 0–0.2)
BASOPHILS NFR BLD AUTO: 0.6 % (ref 0–1.5)
BILIRUB SERPL-MCNC: 0.6 MG/DL (ref 0.2–1.2)
BUN BLD-MCNC: 18 MG/DL (ref 6–20)
BUN/CREAT SERPL: 15.4 (ref 7–25)
CALCIUM SPEC-SCNC: 9.7 MG/DL (ref 8.6–10.5)
CHLORIDE SERPL-SCNC: 91 MMOL/L (ref 98–107)
CO2 SERPL-SCNC: 27.2 MMOL/L (ref 22–29)
CREAT BLD-MCNC: 1.17 MG/DL (ref 0.76–1.27)
DEPRECATED RDW RBC AUTO: 44.8 FL (ref 37–54)
EOSINOPHIL # BLD AUTO: 0.05 10*3/MM3 (ref 0–0.4)
EOSINOPHIL NFR BLD AUTO: 1 % (ref 0.3–6.2)
ERYTHROCYTE [DISTWIDTH] IN BLOOD BY AUTOMATED COUNT: 13 % (ref 12.3–15.4)
GFR SERPL CREATININE-BSD FRML MDRD: 69 ML/MIN/1.73
GLOBULIN UR ELPH-MCNC: 2.5 GM/DL
GLUCOSE BLD-MCNC: 94 MG/DL (ref 65–99)
HBA1C MFR BLD: 5.4 % (ref 4.8–5.6)
HCT VFR BLD AUTO: 43.9 % (ref 37.5–51)
HGB BLD-MCNC: 13.7 G/DL (ref 13–17.7)
IMM GRANULOCYTES # BLD AUTO: 0.03 10*3/MM3 (ref 0–0.05)
IMM GRANULOCYTES NFR BLD AUTO: 0.6 % (ref 0–0.5)
LYMPHOCYTES # BLD AUTO: 1.5 10*3/MM3 (ref 0.7–3.1)
LYMPHOCYTES NFR BLD AUTO: 29.3 % (ref 19.6–45.3)
MAGNESIUM SERPL-MCNC: 2 MG/DL (ref 1.6–2.6)
MCH RBC QN AUTO: 29.4 PG (ref 26.6–33)
MCHC RBC AUTO-ENTMCNC: 31.2 G/DL (ref 31.5–35.7)
MCV RBC AUTO: 94.2 FL (ref 79–97)
MONOCYTES # BLD AUTO: 0.49 10*3/MM3 (ref 0.1–0.9)
MONOCYTES NFR BLD AUTO: 9.6 % (ref 5–12)
NEUTROPHILS # BLD AUTO: 3.02 10*3/MM3 (ref 1.7–7)
NEUTROPHILS NFR BLD AUTO: 58.9 % (ref 42.7–76)
NRBC BLD AUTO-RTO: 0 /100 WBC (ref 0–0.2)
PLATELET # BLD AUTO: 204 10*3/MM3 (ref 140–450)
PMV BLD AUTO: 11.5 FL (ref 6–12)
POTASSIUM BLD-SCNC: 4.5 MMOL/L (ref 3.5–5.2)
PROT SERPL-MCNC: 7.4 G/DL (ref 6–8.5)
RBC # BLD AUTO: 4.66 10*6/MM3 (ref 4.14–5.8)
SODIUM BLD-SCNC: 132 MMOL/L (ref 136–145)
TSH SERPL DL<=0.05 MIU/L-ACNC: 3.91 MIU/ML (ref 0.27–4.2)
WBC NRBC COR # BLD: 5.12 10*3/MM3 (ref 3.4–10.8)

## 2019-05-30 PROCEDURE — 93000 ELECTROCARDIOGRAM COMPLETE: CPT | Performed by: PHYSICIAN ASSISTANT

## 2019-05-30 PROCEDURE — 83036 HEMOGLOBIN GLYCOSYLATED A1C: CPT

## 2019-05-30 PROCEDURE — 84443 ASSAY THYROID STIM HORMONE: CPT

## 2019-05-30 PROCEDURE — 85025 COMPLETE CBC W/AUTO DIFF WBC: CPT

## 2019-05-30 PROCEDURE — 99214 OFFICE O/P EST MOD 30 MIN: CPT | Performed by: PHYSICIAN ASSISTANT

## 2019-05-30 PROCEDURE — 80053 COMPREHEN METABOLIC PANEL: CPT

## 2019-05-30 PROCEDURE — 83735 ASSAY OF MAGNESIUM: CPT

## 2019-05-30 PROCEDURE — 36415 COLL VENOUS BLD VENIPUNCTURE: CPT

## 2019-05-30 NOTE — PATIENT INSTRUCTIONS
EKG is normal.  Check nonfasting labs.  He is to monitor his blood pressure.  He is to call if symptoms persist or if there is any change.

## 2019-05-30 NOTE — PROGRESS NOTES
"Patient Care Team:  Ramana Bautista MD as PCP - General  Ramana Bautista MD as PCP - Family Medicine    Chief Complaint;:   Chief Complaint   Patient presents with   • Spasms     both calfs of legs         Subjective     HPI  38-year-old white male presents to the office today with multiple complaints.  He has been having some intermittent dizziness, muscle spasms and occasionally sharp shooting chest discomfort.  He is concerned he may have vascular issue.  He has no claudication symptoms.  His muscle spasms occur in bed.  He does drink a lot of water.    He does have sleep apnea and is using his CPAP machine every night.  He also has a history of hypertension which is well controlled on current medications.  He does think that possibly the losartan may be contributing to some of his dizziness.  He had been on valsartan until it had been recalled.    He does have occasional \"chest twinges\" not related to activity.  He denies any shortness of breath or diaphoresis.  He is a non-smoker.  Past Medical History:   Diagnosis Date   • Hypertension        Social History     Socioeconomic History   • Marital status:      Spouse name: Not on file   • Number of children: Not on file   • Years of education: Not on file   • Highest education level: Not on file   Tobacco Use   • Smoking status: Never Smoker   • Smokeless tobacco: Never Used   Substance and Sexual Activity   • Alcohol use: Yes     Comment: socially   • Drug use: Defer   • Sexual activity: Defer       Allergies   Allergen Reactions   • Shellfish-Derived Products Anaphylaxis     Edema   • Iodides Other (See Comments)     Tries to be careful about it with one kidney and sensitivity to shellfish       Review of Systems:     Review of Systems   Constitutional: Negative.    HENT: Negative.    Respiratory: Negative.    Cardiovascular: Positive for chest pain and leg swelling. Negative for palpitations.   Musculoskeletal: Positive for myalgias. " "  Neurological: Positive for dizziness and light-headedness. Negative for weakness.   Psychiatric/Behavioral: The patient is nervous/anxious.        Vital Signs  Vitals:    05/30/19 1400 05/30/19 1447   BP: 140/90 124/78   BP Location: Left arm    Patient Position: Sitting    Cuff Size: Adult    Pulse: 76    Temp: 98.7 °F (37.1 °C)    TempSrc: Temporal    Weight: 108 kg (237 lb)    Height: 177.8 cm (70\")    PainSc:   5    PainLoc: Back          Current Outpatient Medications:   •  enalapril (VASOTEC) 10 MG tablet, Take 10 mg by mouth Daily., Disp: , Rfl: 3  •  escitalopram (LEXAPRO) 10 MG tablet, TAKE 1 TABLET BY MOUTH EVERY DAY, Disp: 30 tablet, Rfl: 5  •  fenofibrate (TRICOR) 54 MG tablet, Take 54 mg by mouth Daily., Disp: , Rfl:   •  losartan (COZAAR) 100 MG tablet, Take 100 mg by mouth., Disp: , Rfl: 11    Physical Exam:    Physical Exam   Constitutional: He is oriented to person, place, and time. He appears well-developed and well-nourished.   Neck: Normal range of motion. Neck supple.   Cardiovascular: Normal rate, regular rhythm and normal heart sounds.   Pulmonary/Chest: Effort normal and breath sounds normal.   Musculoskeletal: Normal range of motion. He exhibits no edema, tenderness or deformity.   Lower extremity pulses intact and symmetrical.   Neurological: He is alert and oriented to person, place, and time.   Psychiatric:   Anxious   Nursing note and vitals reviewed.        ECG 12 Lead  Date/Time: 5/30/2019 2:47 PM  Performed by: Mari Shelley PA-C  Authorized by: Mari Shelley PA-C   Comparison: not compared with previous ECG   Rhythm: sinus rhythm  Rate: normal  Conduction: conduction normal  ST Segments: ST segments normal  T Waves: T waves normal  QRS axis: normal  Other: no other findings    Clinical impression: normal ECG              Assessment/Plan   Problem List Items Addressed This Visit        Cardiovascular and Mediastinum    Benign essential hypertension    Relevant Medications    " enalapril (VASOTEC) 10 MG tablet    losartan (COZAAR) 100 MG tablet    Other Relevant Orders    Comprehensive metabolic panel    ECG 12 Lead       Respiratory    NAYELY (obstructive sleep apnea)       Genitourinary    Chronic kidney disease, stage 3 (CMS/HCC)    Relevant Orders    Comprehensive metabolic panel       Other    Prediabetes    Relevant Orders    Comprehensive metabolic panel    Hemoglobin A1c      Other Visit Diagnoses     Dizziness    -  Primary    EKG normal.  Will check nonfasting labs.  He was advised to monitor his blood pressure.    Relevant Orders    ECG 12 Lead    TSH    Magnesium    CBC w AUTO Differential    ECG 12 Lead        Patient Instructions   EKG is normal.  Check nonfasting labs.  He is to monitor his blood pressure.  He is to call if symptoms persist or if there is any change.      Plan of care reviewed with patient at the conclusion of today's visit. Education was provided regarding diagnosis, management, and any prescribed or recommended OTC medications.Patient verbalizes understanding of and agreement with management plan.     Mari Shelley PA-C

## 2019-06-06 DIAGNOSIS — G47.33 OBSTRUCTIVE SLEEP APNEA, ADULT: ICD-10-CM

## 2019-06-06 DIAGNOSIS — G47.34 NOCTURNAL HYPOXEMIA: ICD-10-CM

## 2019-06-06 RX ORDER — FENOFIBRATE 54 MG/1
TABLET ORAL
Qty: 30 TABLET | Refills: 5 | Status: SHIPPED | OUTPATIENT
Start: 2019-06-06 | End: 2019-11-16 | Stop reason: SDUPTHER

## 2019-08-14 RX ORDER — ESCITALOPRAM OXALATE 10 MG/1
TABLET ORAL
Qty: 30 TABLET | Refills: 5 | Status: SHIPPED | OUTPATIENT
Start: 2019-08-14 | End: 2020-01-13

## 2019-11-18 RX ORDER — FENOFIBRATE 54 MG/1
TABLET ORAL
Qty: 30 TABLET | Refills: 3 | Status: SHIPPED | OUTPATIENT
Start: 2019-11-18 | End: 2020-04-13

## 2020-01-13 RX ORDER — ESCITALOPRAM OXALATE 10 MG/1
TABLET ORAL
Qty: 30 TABLET | Refills: 0 | Status: SHIPPED | OUTPATIENT
Start: 2020-01-13 | End: 2020-03-13

## 2020-03-13 RX ORDER — ESCITALOPRAM OXALATE 10 MG/1
TABLET ORAL
Qty: 30 TABLET | Refills: 0 | Status: SHIPPED | OUTPATIENT
Start: 2020-03-13 | End: 2020-04-13

## 2020-04-13 RX ORDER — FENOFIBRATE 54 MG/1
TABLET ORAL
Qty: 30 TABLET | Refills: 3 | Status: SHIPPED | OUTPATIENT
Start: 2020-04-13 | End: 2020-07-31

## 2020-04-13 RX ORDER — ESCITALOPRAM OXALATE 10 MG/1
TABLET ORAL
Qty: 30 TABLET | Refills: 0 | Status: SHIPPED | OUTPATIENT
Start: 2020-04-13 | End: 2020-06-09

## 2020-06-09 RX ORDER — ESCITALOPRAM OXALATE 10 MG/1
TABLET ORAL
Qty: 30 TABLET | Refills: 0 | Status: SHIPPED | OUTPATIENT
Start: 2020-06-09 | End: 2020-06-30

## 2020-06-30 RX ORDER — ESCITALOPRAM OXALATE 10 MG/1
TABLET ORAL
Qty: 30 TABLET | Refills: 0 | Status: SHIPPED | OUTPATIENT
Start: 2020-06-30 | End: 2020-07-31

## 2020-06-30 NOTE — TELEPHONE ENCOUNTER
Provider sign-off needed.  Last OV was with Mari on 5/30/19.  Does have apt with TW scheduled for 7/16/20

## 2020-07-31 RX ORDER — ESCITALOPRAM OXALATE 10 MG/1
TABLET ORAL
Qty: 30 TABLET | Refills: 1 | Status: SHIPPED | OUTPATIENT
Start: 2020-07-31 | End: 2020-09-22 | Stop reason: SDUPTHER

## 2020-07-31 RX ORDER — FENOFIBRATE 54 MG/1
TABLET ORAL
Qty: 30 TABLET | Refills: 1 | Status: SHIPPED | OUTPATIENT
Start: 2020-07-31 | End: 2020-09-22 | Stop reason: SDUPTHER

## 2020-09-17 ENCOUNTER — TELEPHONE (OUTPATIENT)
Dept: INTERNAL MEDICINE | Facility: CLINIC | Age: 40
End: 2020-09-17

## 2020-09-17 DIAGNOSIS — E78.2 MIXED HYPERLIPIDEMIA: ICD-10-CM

## 2020-09-17 DIAGNOSIS — I10 BENIGN ESSENTIAL HYPERTENSION: Primary | ICD-10-CM

## 2020-09-17 DIAGNOSIS — R73.03 PREDIABETES: ICD-10-CM

## 2020-09-21 ENCOUNTER — LAB (OUTPATIENT)
Dept: LAB | Facility: HOSPITAL | Age: 40
End: 2020-09-21

## 2020-09-21 DIAGNOSIS — I10 BENIGN ESSENTIAL HYPERTENSION: ICD-10-CM

## 2020-09-21 DIAGNOSIS — R73.03 PREDIABETES: ICD-10-CM

## 2020-09-21 DIAGNOSIS — E78.2 MIXED HYPERLIPIDEMIA: ICD-10-CM

## 2020-09-21 LAB
ALBUMIN SERPL-MCNC: 4.6 G/DL (ref 3.5–5.2)
ALBUMIN/GLOB SERPL: 1.8 G/DL
ALP SERPL-CCNC: 58 U/L (ref 39–117)
ALT SERPL W P-5'-P-CCNC: 92 U/L (ref 1–41)
ANION GAP SERPL CALCULATED.3IONS-SCNC: 11 MMOL/L (ref 5–15)
AST SERPL-CCNC: 57 U/L (ref 1–40)
BILIRUB SERPL-MCNC: 0.6 MG/DL (ref 0–1.2)
BUN SERPL-MCNC: 14 MG/DL (ref 6–20)
BUN/CREAT SERPL: 13.6 (ref 7–25)
CALCIUM SPEC-SCNC: 9.9 MG/DL (ref 8.6–10.5)
CHLORIDE SERPL-SCNC: 98 MMOL/L (ref 98–107)
CHOLEST SERPL-MCNC: 228 MG/DL (ref 0–200)
CO2 SERPL-SCNC: 26 MMOL/L (ref 22–29)
CREAT SERPL-MCNC: 1.03 MG/DL (ref 0.76–1.27)
GFR SERPL CREATININE-BSD FRML MDRD: 80 ML/MIN/1.73
GLOBULIN UR ELPH-MCNC: 2.6 GM/DL
GLUCOSE SERPL-MCNC: 100 MG/DL (ref 65–99)
HBA1C MFR BLD: 5.4 % (ref 4.8–5.6)
HDLC SERPL-MCNC: 37 MG/DL (ref 40–60)
LDLC SERPL CALC-MCNC: 137 MG/DL (ref 0–100)
LDLC/HDLC SERPL: 3.7 {RATIO}
POTASSIUM SERPL-SCNC: 4.6 MMOL/L (ref 3.5–5.2)
PROT SERPL-MCNC: 7.2 G/DL (ref 6–8.5)
SODIUM SERPL-SCNC: 135 MMOL/L (ref 136–145)
TRIGL SERPL-MCNC: 271 MG/DL (ref 0–150)
VLDLC SERPL-MCNC: 54.2 MG/DL (ref 5–40)

## 2020-09-21 PROCEDURE — 80053 COMPREHEN METABOLIC PANEL: CPT

## 2020-09-21 PROCEDURE — 80061 LIPID PANEL: CPT

## 2020-09-21 PROCEDURE — 83036 HEMOGLOBIN GLYCOSYLATED A1C: CPT

## 2020-09-22 ENCOUNTER — OFFICE VISIT (OUTPATIENT)
Dept: INTERNAL MEDICINE | Facility: CLINIC | Age: 40
End: 2020-09-22

## 2020-09-22 VITALS
BODY MASS INDEX: 35.85 KG/M2 | DIASTOLIC BLOOD PRESSURE: 98 MMHG | WEIGHT: 250.4 LBS | SYSTOLIC BLOOD PRESSURE: 142 MMHG | HEIGHT: 70 IN | TEMPERATURE: 97.5 F | HEART RATE: 72 BPM

## 2020-09-22 DIAGNOSIS — E66.09 CLASS 2 OBESITY DUE TO EXCESS CALORIES WITHOUT SERIOUS COMORBIDITY WITH BODY MASS INDEX (BMI) OF 35.0 TO 35.9 IN ADULT: ICD-10-CM

## 2020-09-22 DIAGNOSIS — Z00.00 PREVENTATIVE HEALTH CARE: Primary | ICD-10-CM

## 2020-09-22 DIAGNOSIS — I10 BENIGN ESSENTIAL HYPERTENSION: ICD-10-CM

## 2020-09-22 DIAGNOSIS — E78.2 MIXED HYPERLIPIDEMIA: ICD-10-CM

## 2020-09-22 DIAGNOSIS — N18.30 CHRONIC KIDNEY DISEASE, STAGE 3 (HCC): ICD-10-CM

## 2020-09-22 DIAGNOSIS — K76.0 FATTY LIVER: ICD-10-CM

## 2020-09-22 DIAGNOSIS — F41.0 PANIC DISORDER WITHOUT AGORAPHOBIA: ICD-10-CM

## 2020-09-22 DIAGNOSIS — G47.33 OSA (OBSTRUCTIVE SLEEP APNEA): ICD-10-CM

## 2020-09-22 PROCEDURE — 90632 HEPA VACCINE ADULT IM: CPT | Performed by: INTERNAL MEDICINE

## 2020-09-22 PROCEDURE — 90471 IMMUNIZATION ADMIN: CPT | Performed by: INTERNAL MEDICINE

## 2020-09-22 PROCEDURE — 99396 PREV VISIT EST AGE 40-64: CPT | Performed by: INTERNAL MEDICINE

## 2020-09-22 RX ORDER — FENOFIBRATE 54 MG/1
54 TABLET ORAL DAILY
Qty: 30 TABLET | Refills: 1 | Status: SHIPPED | OUTPATIENT
Start: 2020-09-22 | End: 2020-12-03

## 2020-09-22 RX ORDER — ESCITALOPRAM OXALATE 10 MG/1
10 TABLET ORAL DAILY
Qty: 30 TABLET | Refills: 11 | Status: SHIPPED | OUTPATIENT
Start: 2020-09-22 | End: 2021-08-30

## 2020-09-22 NOTE — PROGRESS NOTES
[facility]     Fili Mcclellan  1980   [id number]      Patient Care Team:  Ramana Bautista MD as PCP - General  Ramana Bautista MD as PCP - Family Medicine    Chief Complaint::   Chief Complaint   Patient presents with   • Annual Exam        HPI  Mr. Mcclellan 39.  He comes in for follow-up of his hypertension, hyperlipidemia, sleep apnea, obesity, chronic kidney disease panic disorder, fatty liver and for annual examination.  Overall he feels well.  He walks up to 6 miles a day but continues to struggle with weight gain.  He was not working for about 3 months and admits that and he was overeating.  His blood pressure taken at home is consistently in the 120-130/82 in the low range.  He has been on CPAP mask for the last year and a half which has helped very much.  He continues to see his nephrologist annually and his kidney function remained stable.  His anxiety has been well controlled on Lexapro and he has had no panic attacks in years.  There is no fever, cough, shortness of breath or chest pain.    Chronic Conditions:      Patient Active Problem List   Diagnosis   • Abdominal pain   • AR (allergic rhinitis)   • Benign essential hypertension   • Chronic kidney disease, stage 3 (CMS/HCC)   • Mixed hyperlipidemia   • Obesity   • Panic disorder without agoraphobia   • Prediabetes   • Tuberculosis screening   • Snoring   • NAYELY (obstructive sleep apnea)   • Fatty liver        Past Medical History:   Diagnosis Date   • Hypertension        Past Surgical History:   Procedure Laterality Date   • HERNIA REPAIR         Family History   Problem Relation Age of Onset   • Stroke Other    • Hypertension Other    • Breast cancer Mother    • Heart disease Father    • Hypertension Father        Social History     Socioeconomic History   • Marital status:      Spouse name: Not on file   • Number of children: Not on file   • Years of education: Not on file   • Highest education level: Not on  file   Tobacco Use   • Smoking status: Never Smoker   • Smokeless tobacco: Never Used   Substance and Sexual Activity   • Alcohol use: Yes     Comment: socially   • Drug use: Defer   • Sexual activity: Defer       Allergies   Allergen Reactions   • Shellfish-Derived Products Anaphylaxis     Edema   • Iodides Other (See Comments)     Tries to be careful about it with one kidney and sensitivity to shellfish         Current Outpatient Medications:   •  enalapril (VASOTEC) 10 MG tablet, Take 10 mg by mouth Daily., Disp: , Rfl: 3  •  escitalopram (LEXAPRO) 10 MG tablet, Take 1 tablet by mouth Daily., Disp: 30 tablet, Rfl: 11  •  fenofibrate (TRICOR) 54 MG tablet, Take 1 tablet by mouth Daily., Disp: 30 tablet, Rfl: 1  •  losartan (COZAAR) 100 MG tablet, Take 100 mg by mouth., Disp: , Rfl: 11    Immunization History   Administered Date(s) Administered   • Flu Vaccine Quad PF >18YRS 01/05/2016   • Flu Vaccine Quad PF >36MO 10/18/2017, 09/01/2019   • Hepatitis A 01/31/2019   • Tdap 02/15/2016        Health Maintenance Due   Topic Date Due   • HEPATITIS C SCREENING  02/22/2019   • ANNUAL PHYSICAL  06/30/2019   • LIPID PANEL  12/18/2019   • INFLUENZA VACCINE  08/01/2020        Review of Systems   Constitutional: Negative for chills, fatigue and fever.   HENT: Negative for congestion, ear pain and sinus pressure.    Respiratory: Negative for cough, chest tightness, shortness of breath and wheezing.    Cardiovascular: Negative for chest pain and palpitations.   Gastrointestinal: Negative for abdominal pain, blood in stool and constipation.   Skin: Negative for color change.   Allergic/Immunologic: Negative for environmental allergies.   Neurological: Negative for dizziness, speech difficulty and headache.   Psychiatric/Behavioral: Negative for decreased concentration. The patient is not nervous/anxious.         Vital Signs  Vitals:    09/22/20 0825   BP: 158/90   BP Location: Left arm   Patient Position: Sitting   Cuff Size:  "Adult   Pulse: 72   Temp: 97.5 °F (36.4 °C)   Weight: 114 kg (250 lb 6.4 oz)   Height: 177.8 cm (70\")   PainSc: 0-No pain       Physical Exam  Vitals signs and nursing note reviewed.   Constitutional:       Appearance: He is well-developed. He is obese.   HENT:      Head: Normocephalic and atraumatic.      Right Ear: External ear normal.      Left Ear: External ear normal.      Nose: Nose normal.      Mouth/Throat:      Pharynx: No oropharyngeal exudate.   Eyes:      Conjunctiva/sclera: Conjunctivae normal.      Pupils: Pupils are equal, round, and reactive to light.   Neck:      Musculoskeletal: Normal range of motion and neck supple.      Thyroid: No thyromegaly.      Vascular: No JVD.   Cardiovascular:      Rate and Rhythm: Normal rate and regular rhythm.      Heart sounds: Normal heart sounds. No murmur. No friction rub. No gallop.    Pulmonary:      Effort: Pulmonary effort is normal. No respiratory distress.      Breath sounds: Normal breath sounds. No wheezing or rales.   Chest:      Chest wall: No tenderness.   Abdominal:      General: Bowel sounds are normal. There is no distension.      Palpations: Abdomen is soft. There is no mass.      Tenderness: There is no abdominal tenderness. There is no guarding or rebound.      Hernia: No hernia is present.   Musculoskeletal: Normal range of motion.         General: No tenderness.   Lymphadenopathy:      Cervical: No cervical adenopathy.   Skin:     General: Skin is warm and dry.      Findings: No erythema or rash.   Neurological:      Mental Status: He is alert and oriented to person, place, and time.      Cranial Nerves: No cranial nerve deficit.      Sensory: No sensory deficit.      Motor: No abnormal muscle tone.      Coordination: Coordination normal.      Deep Tendon Reflexes: Reflexes normal.   Psychiatric:         Behavior: Behavior normal.         Thought Content: Thought content normal.         Judgment: Judgment normal.        Procedures     Fall Risk " Screen:  STEADI Fall Risk Assessment has not been completed.    Health Habits and Functional and Cognitive Screening:  No flowsheet data found.    Depression Sreening  PHQ-9 Total Score: 0     ACE III MINI             Assessment/Plan:    Fili was seen today for annual exam.    Diagnoses and all orders for this visit:    Preventative health care    Benign essential hypertension    Mixed hyperlipidemia    NAYELY (obstructive sleep apnea)    Class 2 obesity due to excess calories without serious comorbidity with body mass index (BMI) of 35.0 to 35.9 in adult    Chronic kidney disease, stage 3 (CMS/HCC)    Panic disorder without agoraphobia    Fatty liver    Other orders  -     escitalopram (LEXAPRO) 10 MG tablet; Take 1 tablet by mouth Daily.  -     fenofibrate (TRICOR) 54 MG tablet; Take 1 tablet by mouth Daily.  -     Hepatitis A Vaccine Adult IM    Plan    Overall he remains in good health but needs to continue working on reducing calories and losing weight.    Blood pressure is well controlled based on home measurements.  He continues losartan and enalapril per his nephrologist.    Triglycerides remain elevated at 277 but compared to the levels of above 800 that he used to have, this is much better controlled.  We will continue fenofibrate and attempts at weight loss.    Sleep apnea is well controlled on CPAP.    BMI is now over 35.  Once again we discussed the importance of caloric restriction in addition to regular exercise to lose weight.    GFR is normal.  He will continue annual follow-up with nephrology.    Anxiety and panic are asymptomatic on Lexapro.    Liver function tests remain elevated but stable.  He had imaging including an ultrasound and an MRI of the liver 2 years ago which showed fatty liver.  The treatment for this is weight loss and control of triglycerides.        Labs  Results for orders placed or performed in visit on 09/21/20   Comprehensive Metabolic Panel    Specimen: Blood   Result Value  Ref Range    Glucose 100 (H) 65 - 99 mg/dL    BUN 14 6 - 20 mg/dL    Creatinine 1.03 0.76 - 1.27 mg/dL    Sodium 135 (L) 136 - 145 mmol/L    Potassium 4.6 3.5 - 5.2 mmol/L    Chloride 98 98 - 107 mmol/L    CO2 26.0 22.0 - 29.0 mmol/L    Calcium 9.9 8.6 - 10.5 mg/dL    Total Protein 7.2 6.0 - 8.5 g/dL    Albumin 4.60 3.50 - 5.20 g/dL    ALT (SGPT) 92 (H) 1 - 41 U/L    AST (SGOT) 57 (H) 1 - 40 U/L    Alkaline Phosphatase 58 39 - 117 U/L    Total Bilirubin 0.6 0.0 - 1.2 mg/dL    eGFR Non African Amer 80 >60 mL/min/1.73    Globulin 2.6 gm/dL    A/G Ratio 1.8 g/dL    BUN/Creatinine Ratio 13.6 7.0 - 25.0    Anion Gap 11.0 5.0 - 15.0 mmol/L   Hemoglobin A1c    Specimen: Blood   Result Value Ref Range    Hemoglobin A1C 5.40 4.80 - 5.60 %   Lipid Panel    Specimen: Blood   Result Value Ref Range    Total Cholesterol 228 (H) 0 - 200 mg/dL    Triglycerides 271 (H) 0 - 150 mg/dL    HDL Cholesterol 37 (L) 40 - 60 mg/dL    LDL Cholesterol  137 (H) 0 - 100 mg/dL    VLDL Cholesterol 54.2 (H) 5 - 40 mg/dL    LDL/HDL Ratio 3.70         Counseling was given to patient for the following topics: appropriate exercise as he is doing, disease prevention and healthy eating habits.    Plan of care reviewed with patient at the conclusion of today's visit. Education was provided regarding diagnosis, management, and any prescribed or recommended OTC medications.Patient verbalizes understanding of and agreement with management plan.         Ramana Bautista MD

## 2020-12-03 RX ORDER — FENOFIBRATE 54 MG/1
54 TABLET ORAL DAILY
Qty: 30 TABLET | Refills: 5 | Status: SHIPPED | OUTPATIENT
Start: 2020-12-03 | End: 2021-06-02

## 2021-02-09 ENCOUNTER — TELEPHONE (OUTPATIENT)
Dept: INTERNAL MEDICINE | Facility: CLINIC | Age: 41
End: 2021-02-09

## 2021-02-09 ENCOUNTER — TELEMEDICINE (OUTPATIENT)
Dept: INTERNAL MEDICINE | Facility: CLINIC | Age: 41
End: 2021-02-09

## 2021-02-09 DIAGNOSIS — I10 BENIGN ESSENTIAL HYPERTENSION: ICD-10-CM

## 2021-02-09 DIAGNOSIS — S22.42XD: Primary | ICD-10-CM

## 2021-02-09 PROCEDURE — 99214 OFFICE O/P EST MOD 30 MIN: CPT | Performed by: PHYSICIAN ASSISTANT

## 2021-02-09 RX ORDER — TRAMADOL HYDROCHLORIDE 50 MG/1
50 TABLET ORAL EVERY 8 HOURS PRN
Qty: 30 TABLET | Refills: 0 | Status: SHIPPED | OUTPATIENT
Start: 2021-02-09 | End: 2021-02-18 | Stop reason: SDUPTHER

## 2021-02-09 NOTE — TELEPHONE ENCOUNTER
Had a skiing accident Saturday afternoon , went to the hospital in [facility],  Dr. Ren dx left broken ribs 4-5 and gave 10 pain pills , pt states he just taken last one was hoping he could get a Mychart visit. Wife will bring in records today

## 2021-02-09 NOTE — TELEPHONE ENCOUNTER
Hospital follow up scheduled for today.  Can we find out where and what this patient was hospitalized for????  I need records, thank you.

## 2021-02-09 NOTE — PROGRESS NOTES
Patient Care Team:  Ramana Bautista MD as PCP - General  Ramana Bautista MD as PCP - Family Medicine    Chief Complaint::   Chief Complaint   Patient presents with   • Pain      You have chosen to receive care through a video visit. Do you consent to use a video  visit for your medical care today? Yes    Subjective     HPI  40 year old male presents for telemedicine visit to discuss pain following skiing accident he acquired in [place] over the weekend.  Patient reports he was trying to shield his daughter from hitting a pole, and crashed his body into a barrier. He did go to the ER-[facility], near [facility].  CT scan showed nondisplaced fractures of his fifth and sixth lateral ribs.  He does not have his left kidney.  No other bony or soft tissue abnormalities were visualized at the time.  He was discharged with #14 oxycodone 5 mg to take over the weekend.  Has been taking this medication twice daily and is helped with pain.  He does report difficulty with bowel movements.  Has been sleeping in a electric chair, which is helped.  Lidocaine patch also helps.        The following portions of the patient's history were reviewed and updated as appropriate: active problem list, medication list, allergies, family history, social history    Review of Systems:   Review of Systems   Constitutional: Positive for activity change. Negative for appetite change, diaphoresis, fatigue, unexpected weight gain and unexpected weight loss.   HENT: Negative for hearing loss.    Eyes: Negative for blurred vision, double vision and visual disturbance.   Respiratory: Negative for chest tightness and shortness of breath.    Cardiovascular: Negative for chest pain, palpitations and leg swelling.   Gastrointestinal: Negative for abdominal pain, blood in stool, GERD and indigestion.   Endocrine: Negative for cold intolerance and heat intolerance.   Genitourinary: Negative for dysuria and hematuria.    Musculoskeletal: Positive for arthralgias and back pain. Negative for myalgias.   Skin: Negative for skin lesions.   Allergic/Immunologic: Negative for environmental allergies and food allergies.   Neurological: Negative for tremors, seizures, syncope, speech difficulty, weakness, headache, memory problem and confusion.   Hematological: Does not bruise/bleed easily.   Psychiatric/Behavioral: Negative for sleep disturbance and depressed mood. The patient is not nervous/anxious.        Vital Signs  There were no vitals filed for this visit.  There is no height or weight on file to calculate BMI.    Labs  No visits with results within 3 Month(s) from this visit.   Latest known visit with results is:   Lab on 09/21/2020   Component Date Value Ref Range Status   • Glucose 09/21/2020 100* 65 - 99 mg/dL Final   • BUN 09/21/2020 14  6 - 20 mg/dL Final   • Creatinine 09/21/2020 1.03  0.76 - 1.27 mg/dL Final   • Sodium 09/21/2020 135* 136 - 145 mmol/L Final   • Potassium 09/21/2020 4.6  3.5 - 5.2 mmol/L Final   • Chloride 09/21/2020 98  98 - 107 mmol/L Final   • CO2 09/21/2020 26.0  22.0 - 29.0 mmol/L Final   • Calcium 09/21/2020 9.9  8.6 - 10.5 mg/dL Final   • Total Protein 09/21/2020 7.2  6.0 - 8.5 g/dL Final   • Albumin 09/21/2020 4.60  3.50 - 5.20 g/dL Final   • ALT (SGPT) 09/21/2020 92* 1 - 41 U/L Final   • AST (SGOT) 09/21/2020 57* 1 - 40 U/L Final   • Alkaline Phosphatase 09/21/2020 58  39 - 117 U/L Final   • Total Bilirubin 09/21/2020 0.6  0.0 - 1.2 mg/dL Final   • eGFR Non African Amer 09/21/2020 80  >60 mL/min/1.73 Final   • Globulin 09/21/2020 2.6  gm/dL Final   • A/G Ratio 09/21/2020 1.8  g/dL Final   • BUN/Creatinine Ratio 09/21/2020 13.6  7.0 - 25.0 Final   • Anion Gap 09/21/2020 11.0  5.0 - 15.0 mmol/L Final   • Hemoglobin A1C 09/21/2020 5.40  4.80 - 5.60 % Final   • Total Cholesterol 09/21/2020 228* 0 - 200 mg/dL Final   • Triglycerides 09/21/2020 271* 0 - 150 mg/dL Final   • HDL Cholesterol 09/21/2020 37*  40 - 60 mg/dL Final   • LDL Cholesterol  09/21/2020 137* 0 - 100 mg/dL Final   • VLDL Cholesterol 09/21/2020 54.2* 5 - 40 mg/dL Final   • LDL/HDL Ratio 09/21/2020 3.70   Final       Imaging  No radiology results for the last 30 days.      Current Outpatient Medications:   •  enalapril (VASOTEC) 10 MG tablet, Take 10 mg by mouth Daily., Disp: , Rfl: 3  •  escitalopram (LEXAPRO) 10 MG tablet, Take 1 tablet by mouth Daily., Disp: 30 tablet, Rfl: 11  •  fenofibrate (TRICOR) 54 MG tablet, TAKE 1 TABLET BY MOUTH DAILY., Disp: 30 tablet, Rfl: 5  •  losartan (COZAAR) 100 MG tablet, Take 100 mg by mouth., Disp: , Rfl: 11  •  traMADol (ULTRAM) 50 MG tablet, Take 1 tablet by mouth Every 8 (Eight) Hours As Needed for Moderate Pain ., Disp: 30 tablet, Rfl: 0    Physical Exam:    Physical Exam  HENT:      Head: Normocephalic and atraumatic.   Eyes:      Conjunctiva/sclera: Conjunctivae normal.      Pupils: Pupils are equal, round, and reactive to light.   Skin:     Coloration: Skin is not jaundiced or pale.   Neurological:      General: No focal deficit present.      Mental Status: He is alert and oriented to person, place, and time. Mental status is at baseline.   Psychiatric:         Mood and Affect: Mood normal.         Behavior: Behavior normal.         Thought Content: Thought content normal.         Judgment: Judgment normal.         Procedures        Assessment/Plan   Problem List Items Addressed This Visit        Cardiac and Vasculature    Benign essential hypertension    Overview     Continue enalapril 10 mg tablets and losartan 100 mg tablets daily.         Relevant Medications    enalapril (VASOTEC) 10 MG tablet    losartan (COZAAR) 100 MG tablet       Musculoskeletal and Injuries    Closed traumatic nondisplaced fracture of two ribs of left side with routine healing - Primary    Overview     Request for ER records and scan.  Discussed pain management with patient.  Trial of tramadol 50 mg up to every 8 hours #30.   Will call and check in on him on Friday.  He is encouraged to continue lidocaine patches, as he states this does help with pain.  Warm moist heat as tolerated.  He can use Tylenol with tramadol to help with pain management.         Relevant Medications    traMADol (ULTRAM) 50 MG tablet        This visit has been rescheduled as a video visit to comply with patient safety concerns in accordance with CDC recommendations. Total time of discussion was 25 minutes.    Return if symptoms worsen or fail to improve.    Plan of care reviewed with patient at the conclusion of today's visit. Education was provided regarding diagnosis, management, and any prescribed or recommended OTC medications.Patient verbalizes understanding of and agreement with management plan.       Summer Sexton PA-C    Please note that portions of this note were completed with a voice recognition program. Efforts were made to edit the dictations, but occasionally words are mistranscribed.

## 2021-02-09 NOTE — TELEPHONE ENCOUNTER
Patient is scheduled with MARIA FERNANDA Sexton for a hospital follow up on 2/9/21 as Dr. Bautista had no availability.

## 2021-02-12 ENCOUNTER — TELEPHONE (OUTPATIENT)
Dept: INTERNAL MEDICINE | Facility: CLINIC | Age: 41
End: 2021-02-12

## 2021-02-12 NOTE — TELEPHONE ENCOUNTER
Verbalized patient understanding to take 2 extra strength tylenol TID dosing and not to exceed over 3000mg a day.

## 2021-02-12 NOTE — TELEPHONE ENCOUNTER
Too much tylenol.  He can take 2 extra strength tylenol tid dosing. That would be a total of 3000mg/day. Would not exceed this amount.

## 2021-02-12 NOTE — TELEPHONE ENCOUNTER
Called pt - he said he is better -making slow progress. He is taking 1300 mg of tylenol tid with tramadol and wants to know if this is ok

## 2021-02-18 ENCOUNTER — TELEPHONE (OUTPATIENT)
Dept: INTERNAL MEDICINE | Facility: CLINIC | Age: 41
End: 2021-02-18

## 2021-02-18 ENCOUNTER — OFFICE VISIT (OUTPATIENT)
Dept: INTERNAL MEDICINE | Facility: CLINIC | Age: 41
End: 2021-02-18

## 2021-02-18 ENCOUNTER — LAB (OUTPATIENT)
Dept: LAB | Facility: HOSPITAL | Age: 41
End: 2021-02-18

## 2021-02-18 ENCOUNTER — HOSPITAL ENCOUNTER (OUTPATIENT)
Dept: GENERAL RADIOLOGY | Facility: HOSPITAL | Age: 41
Discharge: HOME | End: 2021-02-18

## 2021-02-18 VITALS
DIASTOLIC BLOOD PRESSURE: 82 MMHG | TEMPERATURE: 98 F | BODY MASS INDEX: 37.42 KG/M2 | HEIGHT: 70 IN | HEART RATE: 72 BPM | SYSTOLIC BLOOD PRESSURE: 144 MMHG | WEIGHT: 261.4 LBS

## 2021-02-18 DIAGNOSIS — S22.42XD: ICD-10-CM

## 2021-02-18 DIAGNOSIS — R61 NIGHT SWEATS: ICD-10-CM

## 2021-02-18 DIAGNOSIS — R61 NIGHT SWEATS: Primary | ICD-10-CM

## 2021-02-18 LAB
BASOPHILS # BLD AUTO: 0.04 10*3/MM3 (ref 0–0.2)
BASOPHILS NFR BLD AUTO: 0.7 % (ref 0–1.5)
DEPRECATED RDW RBC AUTO: 40.1 FL (ref 37–54)
EOSINOPHIL # BLD AUTO: 0.07 10*3/MM3 (ref 0–0.4)
EOSINOPHIL NFR BLD AUTO: 1.1 % (ref 0.3–6.2)
ERYTHROCYTE [DISTWIDTH] IN BLOOD BY AUTOMATED COUNT: 12.4 % (ref 12.3–15.4)
HCT VFR BLD AUTO: 38.5 % (ref 37.5–51)
HGB BLD-MCNC: 12.8 G/DL (ref 13–17.7)
IMM GRANULOCYTES # BLD AUTO: 0.05 10*3/MM3 (ref 0–0.05)
IMM GRANULOCYTES NFR BLD AUTO: 0.8 % (ref 0–0.5)
LYMPHOCYTES # BLD AUTO: 1.45 10*3/MM3 (ref 0.7–3.1)
LYMPHOCYTES NFR BLD AUTO: 23.7 % (ref 19.6–45.3)
MCH RBC QN AUTO: 29.5 PG (ref 26.6–33)
MCHC RBC AUTO-ENTMCNC: 33.2 G/DL (ref 31.5–35.7)
MCV RBC AUTO: 88.7 FL (ref 79–97)
MONOCYTES # BLD AUTO: 0.6 10*3/MM3 (ref 0.1–0.9)
MONOCYTES NFR BLD AUTO: 9.8 % (ref 5–12)
NEUTROPHILS NFR BLD AUTO: 3.9 10*3/MM3 (ref 1.7–7)
NEUTROPHILS NFR BLD AUTO: 63.9 % (ref 42.7–76)
NRBC BLD AUTO-RTO: 0 /100 WBC (ref 0–0.2)
PLATELET # BLD AUTO: 256 10*3/MM3 (ref 140–450)
PMV BLD AUTO: 11.3 FL (ref 6–12)
RBC # BLD AUTO: 4.34 10*6/MM3 (ref 4.14–5.8)
WBC # BLD AUTO: 6.11 10*3/MM3 (ref 3.4–10.8)

## 2021-02-18 PROCEDURE — 99214 OFFICE O/P EST MOD 30 MIN: CPT | Performed by: INTERNAL MEDICINE

## 2021-02-18 PROCEDURE — 71046 X-RAY EXAM CHEST 2 VIEWS: CPT

## 2021-02-18 PROCEDURE — 85025 COMPLETE CBC W/AUTO DIFF WBC: CPT

## 2021-02-18 RX ORDER — TRAMADOL HYDROCHLORIDE 50 MG/1
50 TABLET ORAL EVERY 8 HOURS PRN
Qty: 30 TABLET | Refills: 0 | Status: SHIPPED | OUTPATIENT
Start: 2021-02-18 | End: 2021-03-01

## 2021-02-18 NOTE — TELEPHONE ENCOUNTER
PATIENT CALLED AND STATED HE IS HAVING NIGHT SWEATS AND LOW GRADE FEVER.  HE'S ALSO ALMOST OUT OF THE PAIN MEDICATION YOU GAVE HIM SO HE WOULD JUST LIKE TO TALK TO YOU FOR A FEW MINUTES ABOUT THOSE ISSUES.    PLEASE CONTACT PATIENT.    CALLBACK:  [phone]

## 2021-02-18 NOTE — PROGRESS NOTES
[facility]     Fili Mcclellan  1980   [id number]      Patient Care Team:  Ramana Bautista MD as PCP - General  Ramana Bautista MD as PCP - Family Medicine    Chief Complaint::   Chief Complaint   Patient presents with   • Flank Pain   • Night Sweats        HPI  Mr. Mcclellan sustained rib injuries 12 days ago in a ski accident.  He states his young daughter was out of control skiing rapidly down a hill.  He was concerned she was going to hit a barrier and race to protect her and in the process himself hit a pole fairly hard fracturing the left fifth and sixth ribs.  He was seen in a local ED and giving oxycodone.  He was seen by televisit here last week and given tramadol and instructed to take Tylenol.  His pain has been reasonably well-controlled although persistent but for the past few days he has had drenching night sweats and low-grade fevers in the evening as high as 101.3.  There is no cough.  He has no chest congestion or dyspnea.    Chronic Conditions:      Patient Active Problem List   Diagnosis   • Abdominal pain   • AR (allergic rhinitis)   • Benign essential hypertension   • Chronic kidney disease, stage 3 (CMS/HCC)   • Mixed hyperlipidemia   • Obesity   • Panic disorder without agoraphobia   • Prediabetes   • Tuberculosis screening   • Snoring   • NAYELY (obstructive sleep apnea)   • Fatty liver   • Closed traumatic nondisplaced fracture of two ribs of left side with routine healing        Past Medical History:   Diagnosis Date   • Hypertension        Past Surgical History:   Procedure Laterality Date   • HERNIA REPAIR         Family History   Problem Relation Age of Onset   • Stroke Other    • Hypertension Other    • Breast cancer Mother    • Heart disease Father    • Hypertension Father        Social History     Socioeconomic History   • Marital status:      Spouse name: Not on file   • Number of children: Not on file   • Years of education: Not on file   •  Highest education level: Not on file   Tobacco Use   • Smoking status: Never Smoker   • Smokeless tobacco: Never Used   Substance and Sexual Activity   • Alcohol use: Yes     Comment: socially   • Drug use: Defer   • Sexual activity: Defer       Allergies   Allergen Reactions   • Shellfish-Derived Products Anaphylaxis     Edema   • Iodides Other (See Comments)     Tries to be careful about it with one kidney and sensitivity to shellfish         Current Outpatient Medications:   •  Acetaminophen (TYLENOL 8 HOUR ARTHRITIS PAIN PO), Take 1 tablet by mouth 3 (Three) Times a Day. Curently taking about 3000 mg daily, Disp: , Rfl:   •  enalapril (VASOTEC) 10 MG tablet, Take 10 mg by mouth Daily., Disp: , Rfl: 3  •  escitalopram (LEXAPRO) 10 MG tablet, Take 1 tablet by mouth Daily., Disp: 30 tablet, Rfl: 11  •  fenofibrate (TRICOR) 54 MG tablet, TAKE 1 TABLET BY MOUTH DAILY., Disp: 30 tablet, Rfl: 5  •  losartan (COZAAR) 100 MG tablet, Take 100 mg by mouth., Disp: , Rfl: 11  •  traMADol (ULTRAM) 50 MG tablet, Take 1 tablet by mouth Every 8 (Eight) Hours As Needed for Moderate Pain ., Disp: 30 tablet, Rfl: 0    Review of Systems   Constitutional: Negative for chills, fatigue and fever.   HENT: Negative for congestion, ear pain and sinus pressure.    Respiratory: Negative for cough, chest tightness, shortness of breath and wheezing.    Cardiovascular: Negative for chest pain and palpitations.   Gastrointestinal: Negative for abdominal pain, blood in stool and constipation.   Skin: Negative for color change.   Allergic/Immunologic: Negative for environmental allergies.   Neurological: Negative for dizziness, speech difficulty and headache.   Psychiatric/Behavioral: Negative for decreased concentration. The patient is not nervous/anxious.         Vital Signs  Vitals:    02/18/21 1255   BP: 144/82   BP Location: Left arm   Patient Position: Sitting   Cuff Size: Adult   Pulse: 72   Temp: 98 °F (36.7 °C)   Weight: 119 kg (261 lb  "6.4 oz)   Height: 177.8 cm (70\")   PainSc:   7   PainLoc: Rib Cage       Physical Exam  Vitals signs reviewed.   Constitutional:       Appearance: He is well-developed.   HENT:      Head: Normocephalic and atraumatic.   Cardiovascular:      Rate and Rhythm: Normal rate and regular rhythm.      Heart sounds: Normal heart sounds. No murmur.   Pulmonary:      Effort: Pulmonary effort is normal.      Breath sounds: Normal breath sounds.      Comments: Sounds are overall clear but perhaps somewhat diminished on the left side.  No rales or rhonchi are heard.  Neurological:      Mental Status: He is alert and oriented to person, place, and time.          Procedures    ACE III MINI             Assessment/Plan:    Diagnoses and all orders for this visit:    1. Night sweats (Primary)  -     XR Chest PA & Lateral; Future  -     CBC & Differential; Future    2. Closed traumatic nondisplaced fracture of two ribs of left side with routine healing  -     traMADol (ULTRAM) 50 MG tablet; Take 1 tablet by mouth Every 8 (Eight) Hours As Needed for Moderate Pain .  Dispense: 30 tablet; Refill: 0  -     XR Chest PA & Lateral; Future    Plan    In the setting of chest trauma and rib fractures I am concerned that fever and night sweats could be early pneumonia.  Chest x-ray is obtained as well as CBC.      Plan of care reviewed with patient at the conclusion of today's visit. Education was provided regarding diagnosis, management, and any prescribed or recommended OTC medications.Patient verbalizes understanding of and agreement with management plan.         Ramana Bautista MD           "

## 2021-02-18 NOTE — TELEPHONE ENCOUNTER
For the past few days the patient has experienced night sweats and temperature in the evening as high as 101.3.  This follows rib fractures sustained in a skiing accident last week.  He does not have shortness of breath although it does hurt considerably to take a deep breath.  He does not have a cough.  He feels that he can come into the office today.  I will see him at 1:00 and then obtain chest x-ray and CBC.

## 2021-02-19 ENCOUNTER — TELEPHONE (OUTPATIENT)
Dept: INTERNAL MEDICINE | Facility: CLINIC | Age: 41
End: 2021-02-19

## 2021-02-19 NOTE — TELEPHONE ENCOUNTER
Caller: Fili Mcclellan    Relationship: Self    Best call back number: [phone]     Caller requesting test results: SELF    What test was performed: LABS    When was the test performed: 2-18-21    Where was the test performed: [facility]    Additional notes:

## 2021-02-28 ENCOUNTER — HOSPITAL ENCOUNTER (EMERGENCY)
Facility: HOSPITAL | Age: 41
Discharge: HOME | End: 2021-02-28
Attending: EMERGENCY MEDICINE | Admitting: EMERGENCY MEDICINE

## 2021-02-28 ENCOUNTER — APPOINTMENT (OUTPATIENT)
Dept: GENERAL RADIOLOGY | Facility: HOSPITAL | Age: 41
End: 2021-02-28

## 2021-02-28 VITALS
RESPIRATION RATE: 20 BRPM | TEMPERATURE: 98.8 F | HEIGHT: 70 IN | WEIGHT: 250 LBS | HEART RATE: 105 BPM | BODY MASS INDEX: 35.79 KG/M2 | DIASTOLIC BLOOD PRESSURE: 99 MMHG | OXYGEN SATURATION: 95 % | SYSTOLIC BLOOD PRESSURE: 144 MMHG

## 2021-02-28 DIAGNOSIS — M25.561 ACUTE PAIN OF RIGHT KNEE: Primary | ICD-10-CM

## 2021-02-28 LAB
FLUAV RNA RESP QL NAA+PROBE: NOT DETECTED
FLUBV RNA RESP QL NAA+PROBE: NOT DETECTED
SARS-COV-2 RNA RESP QL NAA+PROBE: NOT DETECTED

## 2021-02-28 PROCEDURE — 73560 X-RAY EXAM OF KNEE 1 OR 2: CPT

## 2021-02-28 PROCEDURE — 87636 SARSCOV2 & INF A&B AMP PRB: CPT | Performed by: EMERGENCY MEDICINE

## 2021-02-28 PROCEDURE — 99283 EMERGENCY DEPT VISIT LOW MDM: CPT

## 2021-02-28 RX ORDER — HYDROCODONE BITARTRATE AND ACETAMINOPHEN 5; 325 MG/1; MG/1
1 TABLET ORAL ONCE
Status: COMPLETED | OUTPATIENT
Start: 2021-02-28 | End: 2021-02-28

## 2021-02-28 RX ORDER — LIDOCAINE HYDROCHLORIDE AND EPINEPHRINE 10; 10 MG/ML; UG/ML
10 INJECTION, SOLUTION INFILTRATION; PERINEURAL ONCE
Status: COMPLETED | OUTPATIENT
Start: 2021-02-28 | End: 2021-02-28

## 2021-02-28 RX ADMIN — LIDOCAINE HYDROCHLORIDE,EPINEPHRINE BITARTRATE 10 ML: 10; .01 INJECTION, SOLUTION INFILTRATION; PERINEURAL at 21:30

## 2021-02-28 RX ADMIN — HYDROCODONE BITARTRATE AND ACETAMINOPHEN 1 TABLET: 5; 325 TABLET ORAL at 21:21

## 2021-03-01 ENCOUNTER — HOSPITAL ENCOUNTER (OUTPATIENT)
Dept: CARDIOLOGY | Facility: HOSPITAL | Age: 41
Discharge: HOME | End: 2021-03-01
Admitting: EMERGENCY MEDICINE

## 2021-03-01 ENCOUNTER — OFFICE VISIT (OUTPATIENT)
Dept: ORTHOPEDIC SURGERY | Facility: CLINIC | Age: 41
End: 2021-03-01

## 2021-03-01 VITALS — HEART RATE: 102 BPM | HEIGHT: 70 IN | BODY MASS INDEX: 35.66 KG/M2 | WEIGHT: 249.12 LBS | OXYGEN SATURATION: 98 %

## 2021-03-01 DIAGNOSIS — M25.561 ACUTE PAIN OF RIGHT KNEE: ICD-10-CM

## 2021-03-01 DIAGNOSIS — M25.561 ACUTE PAIN OF RIGHT KNEE: Primary | ICD-10-CM

## 2021-03-01 DIAGNOSIS — S22.42XD: ICD-10-CM

## 2021-03-01 LAB
BH CV LOWER VASCULAR LEFT COMMON FEMORAL AUGMENT: NORMAL
BH CV LOWER VASCULAR LEFT COMMON FEMORAL COMPETENT: NORMAL
BH CV LOWER VASCULAR LEFT COMMON FEMORAL COMPRESS: NORMAL
BH CV LOWER VASCULAR LEFT COMMON FEMORAL PHASIC: NORMAL
BH CV LOWER VASCULAR LEFT COMMON FEMORAL SPONT: NORMAL
BH CV LOWER VASCULAR RIGHT COMMON FEMORAL AUGMENT: NORMAL
BH CV LOWER VASCULAR RIGHT COMMON FEMORAL COMPETENT: NORMAL
BH CV LOWER VASCULAR RIGHT COMMON FEMORAL COMPRESS: NORMAL
BH CV LOWER VASCULAR RIGHT COMMON FEMORAL PHASIC: NORMAL
BH CV LOWER VASCULAR RIGHT COMMON FEMORAL SPONT: NORMAL
BH CV LOWER VASCULAR RIGHT DISTAL FEMORAL AUGMENT: NORMAL
BH CV LOWER VASCULAR RIGHT DISTAL FEMORAL COMPETENT: NORMAL
BH CV LOWER VASCULAR RIGHT DISTAL FEMORAL COMPRESS: NORMAL
BH CV LOWER VASCULAR RIGHT DISTAL FEMORAL PHASIC: NORMAL
BH CV LOWER VASCULAR RIGHT DISTAL FEMORAL SPONT: NORMAL
BH CV LOWER VASCULAR RIGHT GASTRONEMIUS COMPETENT: NORMAL
BH CV LOWER VASCULAR RIGHT GASTRONEMIUS COMPRESS: NORMAL
BH CV LOWER VASCULAR RIGHT GREATER SAPH AK COMPETENT: NORMAL
BH CV LOWER VASCULAR RIGHT GREATER SAPH AK COMPRESS: NORMAL
BH CV LOWER VASCULAR RIGHT GREATER SAPH BK COMPETENT: NORMAL
BH CV LOWER VASCULAR RIGHT GREATER SAPH BK COMPRESS: NORMAL
BH CV LOWER VASCULAR RIGHT LESSER SAPH COMPETENT: NORMAL
BH CV LOWER VASCULAR RIGHT LESSER SAPH COMPRESS: NORMAL
BH CV LOWER VASCULAR RIGHT MID FEMORAL AUGMENT: NORMAL
BH CV LOWER VASCULAR RIGHT MID FEMORAL COMPETENT: NORMAL
BH CV LOWER VASCULAR RIGHT MID FEMORAL COMPRESS: NORMAL
BH CV LOWER VASCULAR RIGHT MID FEMORAL PHASIC: NORMAL
BH CV LOWER VASCULAR RIGHT MID FEMORAL SPONT: NORMAL
BH CV LOWER VASCULAR RIGHT PERONEAL AUGMENT: NORMAL
BH CV LOWER VASCULAR RIGHT PERONEAL COMPETENT: NORMAL
BH CV LOWER VASCULAR RIGHT PERONEAL COMPRESS: NORMAL
BH CV LOWER VASCULAR RIGHT POPLITEAL AUGMENT: NORMAL
BH CV LOWER VASCULAR RIGHT POPLITEAL COMPETENT: NORMAL
BH CV LOWER VASCULAR RIGHT POPLITEAL COMPRESS: NORMAL
BH CV LOWER VASCULAR RIGHT POPLITEAL PHASIC: NORMAL
BH CV LOWER VASCULAR RIGHT POPLITEAL SPONT: NORMAL
BH CV LOWER VASCULAR RIGHT POSTERIOR TIBIAL AUGMENT: NORMAL
BH CV LOWER VASCULAR RIGHT POSTERIOR TIBIAL COMPETENT: NORMAL
BH CV LOWER VASCULAR RIGHT POSTERIOR TIBIAL COMPRESS: NORMAL
BH CV LOWER VASCULAR RIGHT PROFUNDA FEMORAL AUGMENT: NORMAL
BH CV LOWER VASCULAR RIGHT PROFUNDA FEMORAL COMPETENT: NORMAL
BH CV LOWER VASCULAR RIGHT PROFUNDA FEMORAL COMPRESS: NORMAL
BH CV LOWER VASCULAR RIGHT PROFUNDA FEMORAL PHASIC: NORMAL
BH CV LOWER VASCULAR RIGHT PROFUNDA FEMORAL SPONT: NORMAL
BH CV LOWER VASCULAR RIGHT PROXIMAL FEMORAL AUGMENT: NORMAL
BH CV LOWER VASCULAR RIGHT PROXIMAL FEMORAL COMPRESS: NORMAL
BH CV LOWER VASCULAR RIGHT PROXIMAL FEMORAL PHASIC: NORMAL
BH CV LOWER VASCULAR RIGHT PROXIMAL FEMORAL SPONT: NORMAL
BH CV LOWER VASCULAR RIGHT SAPHENOFEMORAL JUNCTION AUGMENT: NORMAL
BH CV LOWER VASCULAR RIGHT SAPHENOFEMORAL JUNCTION COMPETENT: NORMAL
BH CV LOWER VASCULAR RIGHT SAPHENOFEMORAL JUNCTION COMPRESS: NORMAL
BH CV LOWER VASCULAR RIGHT SAPHENOFEMORAL JUNCTION PHASIC: NORMAL
BH CV LOWER VASCULAR RIGHT SAPHENOFEMORAL JUNCTION SPONT: NORMAL

## 2021-03-01 PROCEDURE — 99204 OFFICE O/P NEW MOD 45 MIN: CPT | Performed by: ORTHOPAEDIC SURGERY

## 2021-03-01 PROCEDURE — 93971 EXTREMITY STUDY: CPT

## 2021-03-01 PROCEDURE — 93971 EXTREMITY STUDY: CPT | Performed by: INTERNAL MEDICINE

## 2021-03-01 RX ORDER — TRAMADOL HYDROCHLORIDE 50 MG/1
TABLET ORAL
Qty: 30 TABLET | Refills: 0 | Status: SHIPPED | OUTPATIENT
Start: 2021-03-01 | End: 2021-03-26

## 2021-03-01 NOTE — ED PROVIDER NOTES
Subjective   40-year-old male presents for evaluation of atraumatic right knee pain and swelling.  The patient states that he began experiencing some subjective fevers 3 weeks ago after returning home from a ski trip.  He went to his primary care physician and had a negative chest x-ray and labs drawn.  He notes that after having labs drawn he had some mild redness and swelling to the medial aspect of his right elbow that has subsequently resolved spontaneously.  However, he notes that this morning he began experiencing pain to the anterior aspect of his right knee.  He noted that his knee was a bit swollen compared to baseline as well.  He continued experiencing subjective fevers and took his temperature today and noted that was elevated at 100.3 °F.  He denies any injury or trauma that could have caused the pain.  He denies any IV drug use.  He states that walking is difficult secondary to the pain.  Pain is currently 8 out of 10 in severity and worse with movement and ambulation.          Review of Systems   Constitutional: Positive for fever (subjective).   Musculoskeletal:        Right knee pain   All other systems reviewed and are negative.      Past Medical History:   Diagnosis Date   • Hypertension        Allergies   Allergen Reactions   • Shellfish-Derived Products Anaphylaxis     Edema   • Iodides Other (See Comments)     Tries to be careful about it with one kidney and sensitivity to shellfish       Past Surgical History:   Procedure Laterality Date   • HERNIA REPAIR         Family History   Problem Relation Age of Onset   • Stroke Other    • Hypertension Other    • Breast cancer Mother    • Heart disease Father    • Hypertension Father        Social History     Socioeconomic History   • Marital status:      Spouse name: Not on file   • Number of children: Not on file   • Years of education: Not on file   • Highest education level: Not on file   Tobacco Use   • Smoking status: Never Smoker   •  Smokeless tobacco: Never Used   Substance and Sexual Activity   • Alcohol use: Yes     Comment: socially   • Drug use: Defer   • Sexual activity: Defer           Objective   Physical Exam  Vitals signs and nursing note reviewed.   Constitutional:       General: He is not in acute distress.     Appearance: Normal appearance. He is well-developed. He is not diaphoretic.      Comments: Nontoxic-appearing male   HENT:      Head: Normocephalic and atraumatic.      Comments: No mucous membrane lesions  Neck:      Musculoskeletal: Normal range of motion.      Vascular: No JVD.   Cardiovascular:      Rate and Rhythm: Normal rate and regular rhythm.      Heart sounds: Normal heart sounds. No murmur. No friction rub. No gallop.    Pulmonary:      Effort: Pulmonary effort is normal. No respiratory distress.      Breath sounds: Normal breath sounds. No wheezing or rales.   Abdominal:      General: Bowel sounds are normal. There is no distension.      Palpations: Abdomen is soft. There is no mass.      Tenderness: There is no abdominal tenderness. There is no guarding.   Musculoskeletal: Normal range of motion.      Comments: Tenderness noted to anterior aspect of right knee, mild warmth and erythema noted overlying right patella, no significant effusion noted, no joint laxity, no joint instability, negative anterior and posterior drawer signs    Negative Homans' sign, no palpable cords   Skin:     General: Skin is warm and dry.      Coloration: Skin is not pale.      Findings: No erythema or rash.   Neurological:      General: No focal deficit present.      Mental Status: He is alert and oriented to person, place, and time.      Comments: Patient ambulatory, right lower extremity is neurovascularly intact distally with bounding distal pulses and normal sensation   Psychiatric:         Mood and Affect: Mood normal.         Thought Content: Thought content normal.         Judgment: Judgment normal.         Joint  Aspiration/Injection    Date/Time: 2/28/2021 9:48 PM  Performed by: Yuriy Gordon MD  Authorized by: Yuriy Gordon MD     Consent:     Consent obtained:  Verbal    Consent given by:  Patient    Risks discussed:  Bleeding, incomplete drainage, nerve damage, infection and pain    Alternatives discussed:  No treatment  Location:     Location:  Knee    Knee:  R knee  Anesthesia (see MAR for exact dosages):     Anesthesia method:  Local infiltration    Local anesthetic:  Lidocaine 1% WITH epi  Procedure details:     Preparation: Patient was prepped and draped in usual sterile fashion      Needle gauge:  18 G    Ultrasound guidance: yes      Approach:  Medial    Aspirate amount:  0    Specimen collected: no    Post-procedure details:     Dressing:  Adhesive bandage    Patient tolerance of procedure:  Tolerated well, no immediate complications               ED Course  ED Course as of Feb 28 2147   Sun Feb 28, 2021 2109 40-year-old male presents for evaluation of atraumatic right knee pain and swelling.  The patient states that since returning home from a skiing trip 3 weeks ago he has been experiencing some subjective fevers.  He had some blood work drawn at his primary care physician's office and noted that he had some pain and swelling to the medial aspect of his right elbow afterward that has subsequently resolved.  However, this morning he woke up with pain to the anterior aspect of his knee that has bothered him throughout the day.  He states that his knee is mildly swollen when compared to baseline as well and notes that he has had a maximum temperature of 100.3 °F today.  On arrival, the patient is nontoxic-appearing.  He has no warmth, tenderness, or soft tissue swelling to his right elbow.  Range of motion of his right knee is mildly painful, and he has mild warmth and erythema overlying his patella region.  No joint laxity noted.  No crepitus.  No pain out of proportion to exam.  We will obtain  imaging and will proceed with arthrocentesis of right knee.  Pain control provided.    [DD]   2142 I performed a bedside ultrasound of the patient's right knee which revealed no visible fluid collection or effusion.  Under sterile conditions after obtaining verbal consent an arthrocentesis of the right knee was attempted; however, there was a dry tap and there was no drainable fluid present.  I feel that septic joint is exceedingly unlikely.  Patient reassured.  Plain films negative.  I set the patient up for an ultrasound of his right lower extremity tomorrow to rule out DVT.  I will withhold giving him Lovenox at this time as he only has 1 kidney and I feel that the chances of a DVT or quite low and that the risks of giving him Lovenox outweigh the benefits given his single kidney.  He will follow-up tomorrow as directed.  Additionally, I have referred him to Dr. Frey of orthopedics and he will follow-up within the next week.  Covid swab obtained and results are currently pending.  Agreeable with plan and given appropriate strict return precautions.    [DD]      ED Course User Index  [DD] Yuriy Gordon MD                                 Recent Results (from the past 24 hour(s))   COVID-19 and FLU A/B PCR - Swab, Nasopharynx    Collection Time: 02/28/21  9:43 PM    Specimen: Nasopharynx; Swab   Result Value Ref Range    COVID19 Not Detected Not Detected - Ref. Range    Influenza A PCR Not Detected Not Detected    Influenza B PCR Not Detected Not Detected     Note: In addition to lab results from this visit, the labs listed above may include labs taken at another facility or during a different encounter within the last 24 hours. Please correlate lab times with ED admission and discharge times for further clarification of the services performed during this visit.    XR Knee 1 or 2 View Right   Final Result   Degenerative changes of the right knee. No fracture or effusion.      Signer Name: JIM Krueger,  "MD    Signed: 2/28/2021 9:28 PM    Workstation Name: [id number]     [facility]        Vitals:    02/28/21 2047 02/28/21 2100 02/28/21 2130   BP: (!) 177/107 155/96 144/99   BP Location: Left arm     Patient Position: Sitting     Pulse: 105     Resp: 20     Temp: 98.8 °F (37.1 °C)     TempSrc: Oral     SpO2: 95% 96% 95%   Weight: 113 kg (250 lb)     Height: 177.8 cm (70\")       Medications   lidocaine-EPINEPHrine (XYLOCAINE W/EPI) 1 %-1:262680 injection 10 mL (10 mL Injection Given 2/28/21 2130)   HYDROcodone-acetaminophen (NORCO) 5-325 MG per tablet 1 tablet (1 tablet Oral Given 2/28/21 2121)     ECG/EMG Results (last 24 hours)     ** No results found for the last 24 hours. **        No orders to display                 MDM    Final diagnoses:   Acute pain of right knee            Yuriy Gordon MD  03/01/21 0337    "

## 2021-03-01 NOTE — DISCHARGE INSTRUCTIONS
If you have a Duplex Venous study ordered and have not received a phone call to schedule this test by 10:00 am tomorrow, please call.    [phone] (Monday - Friday)    [phone] (Weekends)

## 2021-03-01 NOTE — PROGRESS NOTES
"      [facility] Orthopaedic Surgery Clinic Note    Subjective     CC: Pain of the Right Knee      HPI    Fili Mcclellan is a 40 y.o. male who presents with new problem of: right knee pain.  Onset: siking accident 3 weeks ago. The issue has been ongoing for 3 week(s). Pain is a 9/10 on the pain scale. Pain is described as throbbing, stabbing and shooting. Associated symptoms include pain and swelling. The pain is worse with walking, standing, sitting, climbing stairs, sleeping, working and leisure; pain medication and/or NSAID improve the pain. Previous treatments have included: bracing.    I have reviewed the following portions of the patient's history:History of Present Illness and review of systems.    Started with a skiing accident 3 weeks ago.  Pain and started 2 weeks later.  Pain is severe is unable to bear weight.  No previous knee injury.      Review of Systems   Constitutional: Negative.  Negative for chills, fatigue and fever.   HENT: Negative.  Negative for congestion and dental problem.    Eyes: Negative.  Negative for blurred vision.   Respiratory: Negative.  Negative for shortness of breath.    Cardiovascular: Negative.  Negative for leg swelling.   Gastrointestinal: Negative.  Negative for abdominal pain.   Endocrine: Negative.  Negative for polyuria.   Genitourinary: Negative.  Negative for difficulty urinating.   Musculoskeletal: Positive for arthralgias.   Skin: Negative.    Allergic/Immunologic: Negative.    Neurological: Negative.    Hematological: Negative.  Negative for adenopathy.   Psychiatric/Behavioral: Negative.  Negative for behavioral problems.       ROS:    Constiutional:Pt denies fever, chills, nausea, or vomiting.  MSK:as above      Objective      Past Medical History  Past Medical History:   Diagnosis Date   • Hypertension          Physical Exam  Pulse 102   Ht 177.8 cm (70\")   Wt 113 kg (249 lb 1.9 oz)   SpO2 98%   BMI 35.74 kg/m²     Body mass index is 35.74 kg/m².    Patient is " well nourished and well developed.        Ortho Exam  Right knee with positive effusion.  Able to bear weight.  Stable ligaments.  Joint  medial and laterally.  Pain with Marisol.    Imaging/Labs/EMG Reviewed:  Imaging Results (Last 24 Hours)     ** No results found for the last 24 hours. **      I viewed his x-rays from February 20 which are negative.  I viewed his ultrasound from today which is negative    Assessment:  1. Acute pain of right knee        Plan:  1. Recommend over the counter anti-inflammatories for pain and/or swelling  2. Ordered an MRI.  I will see him back after the MRI.    Follow Up:   Return for After MRI.      Medical Decision Making  Management Options : Moderate - 1 Undiagnosed New Problem with Uncertain Prognosis        Ramana Blakely M.D., FAAOS  Orthopedic Surgeon  Fellowship Trained Sports Medicine  [facility]  Orthopedics and Sports Medicine  [address] [place] [address], [address]  [place] [address]

## 2021-03-02 ENCOUNTER — HOSPITAL ENCOUNTER (OUTPATIENT)
Dept: MRI IMAGING | Facility: HOSPITAL | Age: 41
Discharge: HOME | End: 2021-03-02
Admitting: ORTHOPAEDIC SURGERY

## 2021-03-02 ENCOUNTER — TELEPHONE (OUTPATIENT)
Dept: ORTHOPEDIC SURGERY | Facility: CLINIC | Age: 41
End: 2021-03-02

## 2021-03-02 DIAGNOSIS — M25.561 ACUTE PAIN OF RIGHT KNEE: ICD-10-CM

## 2021-03-02 DIAGNOSIS — E66.01 MORBIDLY OBESE (HCC): ICD-10-CM

## 2021-03-02 DIAGNOSIS — S22.42XA CLOSED FRACTURE OF MULTIPLE RIBS OF LEFT SIDE, INITIAL ENCOUNTER: Primary | ICD-10-CM

## 2021-03-02 PROCEDURE — 73721 MRI JNT OF LWR EXTRE W/O DYE: CPT

## 2021-03-02 RX ORDER — OXYCODONE HYDROCHLORIDE AND ACETAMINOPHEN 5; 325 MG/1; MG/1
1 TABLET ORAL EVERY 6 HOURS PRN
Qty: 20 TABLET | Refills: 0 | Status: SHIPPED | OUTPATIENT
Start: 2021-03-02 | End: 2021-03-26

## 2021-03-02 NOTE — TELEPHONE ENCOUNTER
I spoke with the patient and advised that Dr. Blakely sent in a medication to his pharmacy and he only needs to take this at night. He understood.    Nicolette   
PATIENT IS REQUESTING PAIN MEDICATION FOR RELIEF. PATIENT HAS BEEN UNABLE TO SLEEP.   
Please advise.    Nicolette   
S/P TAVR (transcatheter aortic valve replacement)
S/P TAVR (transcatheter aortic valve replacement)

## 2021-03-03 ENCOUNTER — TELEPHONE (OUTPATIENT)
Dept: ORTHOPEDIC SURGERY | Facility: CLINIC | Age: 41
End: 2021-03-03

## 2021-03-03 ENCOUNTER — OFFICE VISIT (OUTPATIENT)
Dept: ORTHOPEDIC SURGERY | Facility: CLINIC | Age: 41
End: 2021-03-03

## 2021-03-03 VITALS — HEART RATE: 80 BPM | WEIGHT: 249.12 LBS | BODY MASS INDEX: 35.66 KG/M2 | OXYGEN SATURATION: 97 % | HEIGHT: 70 IN

## 2021-03-03 DIAGNOSIS — S80.01XA CONTUSION OF RIGHT KNEE, INITIAL ENCOUNTER: Primary | ICD-10-CM

## 2021-03-03 DIAGNOSIS — S83.231D COMPLEX TEAR OF MEDIAL MENISCUS OF RIGHT KNEE, UNSPECIFIED WHETHER OLD OR CURRENT TEAR, SUBSEQUENT ENCOUNTER: ICD-10-CM

## 2021-03-03 PROCEDURE — 99213 OFFICE O/P EST LOW 20 MIN: CPT | Performed by: ORTHOPAEDIC SURGERY

## 2021-03-03 NOTE — PROGRESS NOTES
"      [facility] Orthopaedic Surgery Clinic Note    Subjective     CC: Follow-up of the Right Knee (After MRI 03/02/2021)      YASH Mcclellan is a 40 y.o. male.  His knee feels little better.  All his pain is anterior.  No medial pain.    Review of Systems   Constitutional: Negative.  Negative for chills, fatigue and fever.   HENT: Negative.  Negative for congestion and dental problem.    Eyes: Negative.  Negative for blurred vision.   Respiratory: Negative.  Negative for shortness of breath.    Cardiovascular: Negative.  Negative for leg swelling.   Gastrointestinal: Negative.  Negative for abdominal pain.   Endocrine: Negative.  Negative for polyuria.   Genitourinary: Negative.  Negative for difficulty urinating.   Musculoskeletal: Positive for arthralgias.   Skin: Negative.    Allergic/Immunologic: Negative.    Neurological: Negative.    Hematological: Negative.  Negative for adenopathy.   Psychiatric/Behavioral: Negative.  Negative for behavioral problems.       ROS:    Constiutional:Pt denies fever, chills, nausea, or vomiting.  MSK:as above      Objective      Past Medical History  Past Medical History:   Diagnosis Date   • Hypertension          Physical Exam  Pulse 80   Ht 177.8 cm (70\")   Wt 113 kg (249 lb 1.9 oz)   SpO2 97%   BMI 35.74 kg/m²     Body mass index is 35.74 kg/m².    Patient is well nourished and well developed.        Ortho Exam  Anterior knee pain and swelling.  No tenderness over medial meniscus    Imaging/Labs/EMG Reviewed:  Imaging Results (Last 24 Hours)     ** No results found for the last 24 hours. **          Assessment:  1. Contusion of right knee, initial encounter    2. Complex tear of medial meniscus of right knee, unspecified whether old or current tear, subsequent encounter    Viewed his MRI from yesterday which shows medial meniscus tear and prepatellar edema    Plan:  1. Recommend over the counter anti-inflammatories for pain and/or swelling  2. Plans physical " therapy.  He will do that a PT problems across the street.  Follow-up in 2 weeks.  Continue Tylenol.    Follow Up:   Return in about 2 weeks (around 3/17/2021).      Medical Decision Making  Management Options : Low - OT or PT Therapy         Ramana Blakely M.D., FAAOS  Orthopedic Surgeon  Fellowship Trained Sports Medicine  [facility]  Orthopedics and Sports Medicine  [address], [address]  [place] [address]

## 2021-03-03 NOTE — TELEPHONE ENCOUNTER
PATIENT WOULD LIKE TO KNOW IF YOU WOULD ORDER LAB WORK TO RULE OUT GOUT SINCE HIS PAIN IS NOT WHERE THE TEAR IS.

## 2021-03-03 NOTE — TELEPHONE ENCOUNTER
I spoke with patient to let him know Dr. Blakely does not think the imaging looks like gout and is not concerned for gout.  The patient is wondering why his knee continues to hurt, I told him with his injury the pain can persist.  I suggested to rest, ice, elevate, and medication.   The patient understood.

## 2021-03-04 ENCOUNTER — LAB (OUTPATIENT)
Dept: LAB | Facility: HOSPITAL | Age: 41
End: 2021-03-04

## 2021-03-04 ENCOUNTER — TELEPHONE (OUTPATIENT)
Dept: ORTHOPEDIC SURGERY | Facility: CLINIC | Age: 41
End: 2021-03-04

## 2021-03-04 DIAGNOSIS — M25.561 ACUTE PAIN OF RIGHT KNEE: Primary | ICD-10-CM

## 2021-03-04 DIAGNOSIS — M25.561 ACUTE PAIN OF RIGHT KNEE: ICD-10-CM

## 2021-03-04 LAB
BASOPHILS # BLD AUTO: 0.05 10*3/MM3 (ref 0–0.2)
BASOPHILS NFR BLD AUTO: 0.8 % (ref 0–1.5)
DEPRECATED RDW RBC AUTO: 38.3 FL (ref 37–54)
EOSINOPHIL # BLD AUTO: 0.14 10*3/MM3 (ref 0–0.4)
EOSINOPHIL NFR BLD AUTO: 2.3 % (ref 0.3–6.2)
ERYTHROCYTE [DISTWIDTH] IN BLOOD BY AUTOMATED COUNT: 12.3 % (ref 12.3–15.4)
ERYTHROCYTE [SEDIMENTATION RATE] IN BLOOD: 43 MM/HR (ref 0–15)
HCT VFR BLD AUTO: 38.8 % (ref 37.5–51)
HGB BLD-MCNC: 13 G/DL (ref 13–17.7)
IMM GRANULOCYTES # BLD AUTO: 0.06 10*3/MM3 (ref 0–0.05)
IMM GRANULOCYTES NFR BLD AUTO: 1 % (ref 0–0.5)
LYMPHOCYTES # BLD AUTO: 1.9 10*3/MM3 (ref 0.7–3.1)
LYMPHOCYTES NFR BLD AUTO: 31.8 % (ref 19.6–45.3)
MCH RBC QN AUTO: 28.9 PG (ref 26.6–33)
MCHC RBC AUTO-ENTMCNC: 33.5 G/DL (ref 31.5–35.7)
MCV RBC AUTO: 86.2 FL (ref 79–97)
MONOCYTES # BLD AUTO: 0.51 10*3/MM3 (ref 0.1–0.9)
MONOCYTES NFR BLD AUTO: 8.5 % (ref 5–12)
NEUTROPHILS NFR BLD AUTO: 3.32 10*3/MM3 (ref 1.7–7)
NEUTROPHILS NFR BLD AUTO: 55.6 % (ref 42.7–76)
NRBC BLD AUTO-RTO: 0 /100 WBC (ref 0–0.2)
PLATELET # BLD AUTO: 334 10*3/MM3 (ref 140–450)
PMV BLD AUTO: 11.3 FL (ref 6–12)
RBC # BLD AUTO: 4.5 10*6/MM3 (ref 4.14–5.8)
WBC # BLD AUTO: 5.98 10*3/MM3 (ref 3.4–10.8)

## 2021-03-04 PROCEDURE — 36415 COLL VENOUS BLD VENIPUNCTURE: CPT

## 2021-03-04 PROCEDURE — 85652 RBC SED RATE AUTOMATED: CPT

## 2021-03-04 PROCEDURE — 85025 COMPLETE CBC W/AUTO DIFF WBC: CPT

## 2021-03-04 PROCEDURE — 86140 C-REACTIVE PROTEIN: CPT

## 2021-03-04 PROCEDURE — 84550 ASSAY OF BLOOD/URIC ACID: CPT

## 2021-03-04 NOTE — TELEPHONE ENCOUNTER
I spoke with the patient, told him the orders are in and where the have labs drawn ([facility]). The patient understood.

## 2021-03-05 LAB
CRP SERPL-MCNC: 0.49 MG/DL (ref 0–0.5)
URATE SERPL-MCNC: 6.3 MG/DL (ref 3.4–7)

## 2021-03-05 RX ORDER — CEPHALEXIN 500 MG/1
500 CAPSULE ORAL EVERY 12 HOURS
Qty: 10 CAPSULE | Refills: 0 | Status: SHIPPED | OUTPATIENT
Start: 2021-03-05 | End: 2021-03-26

## 2021-03-26 ENCOUNTER — OFFICE VISIT (OUTPATIENT)
Dept: ORTHOPEDIC SURGERY | Facility: CLINIC | Age: 41
End: 2021-03-26

## 2021-03-26 VITALS
HEIGHT: 70 IN | HEART RATE: 76 BPM | SYSTOLIC BLOOD PRESSURE: 154 MMHG | WEIGHT: 249.12 LBS | BODY MASS INDEX: 35.66 KG/M2 | DIASTOLIC BLOOD PRESSURE: 84 MMHG

## 2021-03-26 DIAGNOSIS — M25.561 ACUTE PAIN OF RIGHT KNEE: Primary | ICD-10-CM

## 2021-03-26 PROCEDURE — 99212 OFFICE O/P EST SF 10 MIN: CPT | Performed by: ORTHOPAEDIC SURGERY

## 2021-03-26 NOTE — PROGRESS NOTES
"      [facility] Orthopaedic Surgery Clinic Note    Subjective     CC: Follow-up (3 week follow up; Contusion of right knee)      YASH Mcclellan is a 40 y.o. male.  He is 100% better.  No pain.  No complaints.    Review of Systems   Constitutional: Negative.    HENT: Negative.    Eyes: Negative.    Respiratory: Negative.    Cardiovascular: Negative.    Gastrointestinal: Negative.    Endocrine: Negative.    Genitourinary: Negative.    Musculoskeletal: Positive for arthralgias.   Skin: Negative.    Allergic/Immunologic: Negative.    Neurological: Negative.    Hematological: Negative.    Psychiatric/Behavioral: Negative.        ROS:    Constiutional:Pt denies fever, chills, nausea, or vomiting.  MSK:as above      Objective      Past Medical History  Past Medical History:   Diagnosis Date   • Hypertension          Physical Exam  /84   Pulse 76   Ht 177.8 cm (70\")   Wt 113 kg (249 lb 1.9 oz)   BMI 35.74 kg/m²     Body mass index is 35.74 kg/m².    Patient is well nourished and well developed.        Ortho Exam  Right knee has no swelling no redness no erythema no deformity  Normal exam    Imaging/Labs/EMG Reviewed:  Imaging Results (Last 24 Hours)     ** No results found for the last 24 hours. **          Assessment:  1. Acute pain of right knee        Plan:  1. Recommend over the counter anti-inflammatories for pain and/or swelling  2. His symptoms have completely resolved.  He never took the antibiotics.  This is consistent with an aseptic prepatellar bursitis.    Follow Up:   Return if symptoms worsen or fail to improve.      Medical Decision Making  Management Options : Low - OTC Drugs        Ramana Blakely M.D., [facility]  Orthopedic Surgeon  Fellowship Trained Sports Medicine  [facility]  Orthopedics and Sports Medicine  [address] [place] [address], [address]  [place] [address]  "

## 2021-06-02 RX ORDER — FENOFIBRATE 54 MG/1
54 TABLET ORAL DAILY
Qty: 30 TABLET | Refills: 5 | Status: SHIPPED | OUTPATIENT
Start: 2021-06-02 | End: 2021-12-27

## 2021-07-22 ENCOUNTER — TELEPHONE (OUTPATIENT)
Dept: SLEEP MEDICINE | Facility: HOSPITAL | Age: 41
End: 2021-07-22

## 2021-07-22 NOTE — TELEPHONE ENCOUNTER
PT CALLED IN ASKING ABOUT THE CPAP RECALL. I INFORMED THE PATIENT THAT THEY MUST DECIDE WHETHER OR NOT TO CONTINUE USING THEIR MACHINE AFTER WEIGHING THE RISKS GIVEN THEIR PERSONAL CIRCUMSTANCES. PATIENT VERBALIZED UDNERSTANDING. ALSO INFORMED THE PT THAT THEY CAN CALL US BACK IF THEY HAVE ANY QUESTIONS OR CONCERNS.     PT IS REQUESTING HIS CPAP RX. STATES HE WILL COME INTO THE OFFICE TOMORROW AND PICK IT UP.     PLEASE ADVISE.

## 2021-07-23 ENCOUNTER — OFFICE VISIT (OUTPATIENT)
Dept: SLEEP MEDICINE | Facility: HOSPITAL | Age: 41
End: 2021-07-23

## 2021-07-23 VITALS
DIASTOLIC BLOOD PRESSURE: 88 MMHG | HEIGHT: 70 IN | HEART RATE: 75 BPM | WEIGHT: 268 LBS | SYSTOLIC BLOOD PRESSURE: 148 MMHG | BODY MASS INDEX: 38.37 KG/M2 | OXYGEN SATURATION: 96 %

## 2021-07-23 DIAGNOSIS — G47.33 OSA (OBSTRUCTIVE SLEEP APNEA): Primary | ICD-10-CM

## 2021-07-23 PROCEDURE — 99213 OFFICE O/P EST LOW 20 MIN: CPT | Performed by: NURSE PRACTITIONER

## 2021-07-23 NOTE — PROGRESS NOTES
"    Chief Complaint:   Chief Complaint   Patient presents with   • Follow-up       HPI:    Fili Mcclellan is a 40 y.o. male here for follow-up of sleep apnea.  Patient was last seen 5/14/2019 for severe obstructive sleep apnea.  Patient continues to do well with CPAP therapy.  Patient is sleeping 8 to 9 hours nightly and does feel refreshed upon awakening.  Patient has an [place] score of 4/24.  Patient states he is doing very well.  Patient is going to continue to use his recall machine after he reviewed his health history versus consequences of untreated sleep apnea.  Patient states however he does travel a lot for work and would like a hand carry prescription today so that he may \"shop around for less expensive model.\"  Patient has no concerns or complaints and wishes to continue CPAP.      Current medications are:   Current Outpatient Medications:   •  Acetaminophen (TYLENOL 8 HOUR ARTHRITIS PAIN PO), Take 1 tablet by mouth 3 (Three) Times a Day. Curently taking about 3000 mg daily, Disp: , Rfl:   •  enalapril (VASOTEC) 10 MG tablet, Take 10 mg by mouth Daily., Disp: , Rfl: 3  •  escitalopram (LEXAPRO) 10 MG tablet, Take 1 tablet by mouth Daily., Disp: 30 tablet, Rfl: 11  •  fenofibrate (TRICOR) 54 MG tablet, TAKE 1 TABLET BY MOUTH DAILY., Disp: 30 tablet, Rfl: 5  •  losartan (COZAAR) 100 MG tablet, Take 100 mg by mouth., Disp: , Rfl: 11.      The patient's relevant past medical, surgical, family and social history were reviewed and updated in Epic as appropriate.       Review of Systems   Respiratory: Positive for apnea.    Genitourinary: Positive for frequency.   Musculoskeletal: Positive for back pain.   Allergic/Immunologic: Positive for food allergies.   Neurological: Positive for dizziness and light-headedness.   Psychiatric/Behavioral: Positive for sleep disturbance. The patient is nervous/anxious.    All other systems reviewed and are negative.        Objective:    Physical Exam  Constitutional:  "      Appearance: Normal appearance.   HENT:      Head: Normocephalic and atraumatic.      Mouth/Throat:      Mouth: Mucous membranes are moist.      Pharynx: Oropharynx is clear.      Comments: Mallampati 2 anatomy  Eyes:      Conjunctiva/sclera: Conjunctivae normal.      Pupils: Pupils are equal, round, and reactive to light.   Cardiovascular:      Rate and Rhythm: Normal rate and regular rhythm.   Pulmonary:      Effort: Pulmonary effort is normal.      Breath sounds: Normal breath sounds.   Skin:     General: Skin is warm and dry.      Coloration: Skin is not pale.   Neurological:      Mental Status: He is alert and oriented to person, place, and time.   Psychiatric:         Mood and Affect: Mood normal.         Behavior: Behavior normal.         Thought Content: Thought content normal.         Judgment: Judgment normal.       90/90 days of use  Greater than 4-hour use 100%  90% pressure 10.6  AHI 1.1  Settings 8-18    ASSESSMENT/PLAN    Diagnoses and all orders for this visit:    1. NAYELY (obstructive sleep apnea) (Primary)  -     CPAP Therapy  -     CPAP Therapy            1. Counseled patient regarding multimodal approach with healthy nutrition, healthy sleep, regular physical activity, social activities, counseling, and medications. Encouraged to practice lateral sleep position. Avoid alcohol and sedatives close to bedtime.  2. Refill supplies x1 year patient has also been given a hand carry order for travel machine.  I will see patient back in 1 year or sooner symptoms warrant.    I have reviewed the results of my evaluation and impression and discussed my recommendations in detail with the patient.      Signed by  ROBERTO CARLOS Corona    July 23, 2021      CC: Ramana Bautista MD          No ref. provider found

## 2021-08-30 RX ORDER — ESCITALOPRAM OXALATE 10 MG/1
10 TABLET ORAL DAILY
Qty: 30 TABLET | Refills: 11 | Status: SHIPPED | OUTPATIENT
Start: 2021-08-30 | End: 2022-08-24

## 2021-10-12 ENCOUNTER — LAB (OUTPATIENT)
Dept: LAB | Facility: HOSPITAL | Age: 41
End: 2021-10-12

## 2021-10-12 DIAGNOSIS — E78.2 MIXED HYPERLIPIDEMIA: ICD-10-CM

## 2021-10-12 DIAGNOSIS — R73.03 PREDIABETES: ICD-10-CM

## 2021-10-12 DIAGNOSIS — I10 BENIGN ESSENTIAL HYPERTENSION: Primary | ICD-10-CM

## 2021-10-12 DIAGNOSIS — I10 BENIGN ESSENTIAL HYPERTENSION: ICD-10-CM

## 2021-10-12 DIAGNOSIS — Z11.59 ENCOUNTER FOR HEPATITIS C SCREENING TEST FOR LOW RISK PATIENT: ICD-10-CM

## 2021-10-12 LAB
ALBUMIN SERPL-MCNC: 4.7 G/DL (ref 3.5–5.2)
ALBUMIN UR-MCNC: <1.2 MG/DL
ALBUMIN/GLOB SERPL: 2 G/DL
ALP SERPL-CCNC: 65 U/L (ref 39–117)
ALT SERPL W P-5'-P-CCNC: 66 U/L (ref 1–41)
ANION GAP SERPL CALCULATED.3IONS-SCNC: 12.5 MMOL/L (ref 5–15)
AST SERPL-CCNC: 60 U/L (ref 1–40)
BASOPHILS # BLD AUTO: 0.05 10*3/MM3 (ref 0–0.2)
BASOPHILS NFR BLD AUTO: 1.1 % (ref 0–1.5)
BILIRUB SERPL-MCNC: 0.5 MG/DL (ref 0–1.2)
BUN SERPL-MCNC: 14 MG/DL (ref 6–20)
BUN/CREAT SERPL: 14.4 (ref 7–25)
CALCIUM SPEC-SCNC: 9.3 MG/DL (ref 8.6–10.5)
CHLORIDE SERPL-SCNC: 99 MMOL/L (ref 98–107)
CHOLEST SERPL-MCNC: 221 MG/DL (ref 0–200)
CO2 SERPL-SCNC: 25.5 MMOL/L (ref 22–29)
CREAT SERPL-MCNC: 0.97 MG/DL (ref 0.76–1.27)
CREAT UR-MCNC: 35.8 MG/DL
DEPRECATED RDW RBC AUTO: 43.9 FL (ref 37–54)
EOSINOPHIL # BLD AUTO: 0.12 10*3/MM3 (ref 0–0.4)
EOSINOPHIL NFR BLD AUTO: 2.6 % (ref 0.3–6.2)
ERYTHROCYTE [DISTWIDTH] IN BLOOD BY AUTOMATED COUNT: 13.1 % (ref 12.3–15.4)
GFR SERPL CREATININE-BSD FRML MDRD: 85 ML/MIN/1.73
GLOBULIN UR ELPH-MCNC: 2.4 GM/DL
GLUCOSE SERPL-MCNC: 95 MG/DL (ref 65–99)
HBA1C MFR BLD: 5.76 % (ref 4.8–5.6)
HCT VFR BLD AUTO: 40.3 % (ref 37.5–51)
HCV AB SER DONR QL: NORMAL
HDLC SERPL-MCNC: 31 MG/DL (ref 40–60)
HGB BLD-MCNC: 13.6 G/DL (ref 13–17.7)
IMM GRANULOCYTES # BLD AUTO: 0.03 10*3/MM3 (ref 0–0.05)
IMM GRANULOCYTES NFR BLD AUTO: 0.6 % (ref 0–0.5)
LDLC SERPL CALC-MCNC: 108 MG/DL (ref 0–100)
LDLC/HDLC SERPL: 3.05 {RATIO}
LYMPHOCYTES # BLD AUTO: 1.78 10*3/MM3 (ref 0.7–3.1)
LYMPHOCYTES NFR BLD AUTO: 38.4 % (ref 19.6–45.3)
MCH RBC QN AUTO: 30.9 PG (ref 26.6–33)
MCHC RBC AUTO-ENTMCNC: 33.7 G/DL (ref 31.5–35.7)
MCV RBC AUTO: 91.6 FL (ref 79–97)
MICROALBUMIN/CREAT UR: NORMAL MG/G{CREAT}
MONOCYTES # BLD AUTO: 0.48 10*3/MM3 (ref 0.1–0.9)
MONOCYTES NFR BLD AUTO: 10.3 % (ref 5–12)
NEUTROPHILS NFR BLD AUTO: 2.18 10*3/MM3 (ref 1.7–7)
NEUTROPHILS NFR BLD AUTO: 47 % (ref 42.7–76)
NRBC BLD AUTO-RTO: 0.2 /100 WBC (ref 0–0.2)
PLATELET # BLD AUTO: 221 10*3/MM3 (ref 140–450)
PMV BLD AUTO: 11.6 FL (ref 6–12)
POTASSIUM SERPL-SCNC: 4.9 MMOL/L (ref 3.5–5.2)
PROT SERPL-MCNC: 7.1 G/DL (ref 6–8.5)
RBC # BLD AUTO: 4.4 10*6/MM3 (ref 4.14–5.8)
SODIUM SERPL-SCNC: 137 MMOL/L (ref 136–145)
TRIGL SERPL-MCNC: 478 MG/DL (ref 0–150)
VLDLC SERPL-MCNC: 82 MG/DL (ref 5–40)
WBC # BLD AUTO: 4.64 10*3/MM3 (ref 3.4–10.8)

## 2021-10-12 PROCEDURE — 86803 HEPATITIS C AB TEST: CPT

## 2021-10-12 PROCEDURE — 85025 COMPLETE CBC W/AUTO DIFF WBC: CPT

## 2021-10-12 PROCEDURE — 80053 COMPREHEN METABOLIC PANEL: CPT

## 2021-10-12 PROCEDURE — 80061 LIPID PANEL: CPT

## 2021-10-12 PROCEDURE — 83036 HEMOGLOBIN GLYCOSYLATED A1C: CPT

## 2021-10-12 PROCEDURE — 82570 ASSAY OF URINE CREATININE: CPT

## 2021-10-12 PROCEDURE — 82043 UR ALBUMIN QUANTITATIVE: CPT

## 2021-10-13 ENCOUNTER — OFFICE VISIT (OUTPATIENT)
Dept: INTERNAL MEDICINE | Facility: CLINIC | Age: 41
End: 2021-10-13

## 2021-10-13 VITALS
SYSTOLIC BLOOD PRESSURE: 134 MMHG | TEMPERATURE: 98.4 F | BODY MASS INDEX: 38.69 KG/M2 | OXYGEN SATURATION: 100 % | DIASTOLIC BLOOD PRESSURE: 94 MMHG | HEART RATE: 70 BPM | WEIGHT: 261.2 LBS | HEIGHT: 69 IN

## 2021-10-13 DIAGNOSIS — N18.31 STAGE 3A CHRONIC KIDNEY DISEASE (HCC): ICD-10-CM

## 2021-10-13 DIAGNOSIS — Z82.49 FAMILY HISTORY OF PREMATURE CAD: ICD-10-CM

## 2021-10-13 DIAGNOSIS — E78.2 MIXED HYPERLIPIDEMIA: ICD-10-CM

## 2021-10-13 DIAGNOSIS — Z00.00 PREVENTATIVE HEALTH CARE: Primary | ICD-10-CM

## 2021-10-13 DIAGNOSIS — F41.0 PANIC DISORDER WITHOUT AGORAPHOBIA: ICD-10-CM

## 2021-10-13 DIAGNOSIS — K76.0 FATTY LIVER: ICD-10-CM

## 2021-10-13 DIAGNOSIS — Z91.89 CHRONIC CHEST PAIN WITH HIGH RISK FOR CAD: ICD-10-CM

## 2021-10-13 DIAGNOSIS — G89.29 CHRONIC CHEST PAIN WITH HIGH RISK FOR CAD: ICD-10-CM

## 2021-10-13 DIAGNOSIS — G47.33 OSA (OBSTRUCTIVE SLEEP APNEA): ICD-10-CM

## 2021-10-13 DIAGNOSIS — E66.01 MORBIDLY OBESE (HCC): ICD-10-CM

## 2021-10-13 DIAGNOSIS — I10 BENIGN ESSENTIAL HYPERTENSION: ICD-10-CM

## 2021-10-13 DIAGNOSIS — R07.9 CHRONIC CHEST PAIN WITH HIGH RISK FOR CAD: ICD-10-CM

## 2021-10-13 DIAGNOSIS — R73.03 PREDIABETES: ICD-10-CM

## 2021-10-13 PROBLEM — R10.9 ABDOMINAL PAIN: Status: RESOLVED | Noted: 2019-02-22 | Resolved: 2021-10-13

## 2021-10-13 PROBLEM — S22.42XD: Status: RESOLVED | Noted: 2021-02-09 | Resolved: 2021-10-13

## 2021-10-13 PROBLEM — Z11.1 TUBERCULOSIS SCREENING: Status: RESOLVED | Noted: 2019-02-22 | Resolved: 2021-10-13

## 2021-10-13 PROCEDURE — 99396 PREV VISIT EST AGE 40-64: CPT | Performed by: INTERNAL MEDICINE

## 2021-10-13 NOTE — PROGRESS NOTES
[facility]     Cook Chance Mcclellan  1980   [id number]      Patient Care Team:  Ramana Bautista MD as PCP - General  Ramana Bautista MD as PCP - Family Medicine    Chief Complaint::   Chief Complaint   Patient presents with   • Annual Exam        HPI  Mr. Mcclellan is now 40.  He comes in for follow-up of his hyperlipidemia, hypertension, obesity, prediabetes, panic disorder, fatty liver and for annual examination.  Overall he feels well but he continues to struggle with weight.  For the past week and a half he has been on plant-based diet and already is seeing results.  He continues to see his nephrologist for chronic kidney disease.  He continues to have some knee pain from his skiing injury last year.    Chronic Conditions:      Patient Active Problem List   Diagnosis   • AR (allergic rhinitis)   • Benign essential hypertension   • Chronic kidney disease, stage 3 (HCC)   • Mixed hyperlipidemia   • Obesity   • Panic disorder without agoraphobia   • Prediabetes   • Snoring   • NAYELY (obstructive sleep apnea)   • Fatty liver   • Morbidly obese (HCC)        Past Medical History:   Diagnosis Date   • Closed traumatic nondisplaced fracture of two ribs of left side with routine healing 2/9/2021    Request for ER records and scan.  Discussed pain management with patient.  Trial of tramadol 50 mg up to every 8 hours #30.  Will call and check in on him on Friday.  He is encouraged to continue lidocaine patches, as he states this does help with pain.  Warm moist heat as tolerated. He can use Tylenol with tramadol to help with pain management.   • Hypertension        Past Surgical History:   Procedure Laterality Date   • HERNIA REPAIR         Family History   Problem Relation Age of Onset   • Stroke Other    • Hypertension Other    • Breast cancer Mother    • Heart disease Father    • Hypertension Father        Social History     Socioeconomic History   • Marital status:    Tobacco Use    • Smoking status: Never Smoker   • Smokeless tobacco: Never Used   Vaping Use   • Vaping Use: Never used   Substance and Sexual Activity   • Alcohol use: Yes     Comment: socially   • Drug use: Defer   • Sexual activity: Defer       Allergies   Allergen Reactions   • Shellfish-Derived Products Anaphylaxis     Edema   • Iodides Other (See Comments)     Tries to be careful about it with one kidney and sensitivity to shellfish         Current Outpatient Medications:   •  enalapril (VASOTEC) 10 MG tablet, Take 10 mg by mouth Daily., Disp: , Rfl: 3  •  escitalopram (LEXAPRO) 10 MG tablet, TAKE 1 TABLET BY MOUTH DAILY., Disp: 30 tablet, Rfl: 11  •  fenofibrate (TRICOR) 54 MG tablet, TAKE 1 TABLET BY MOUTH DAILY., Disp: 30 tablet, Rfl: 5  •  losartan (COZAAR) 100 MG tablet, Take 100 mg by mouth., Disp: , Rfl: 11    Immunization History   Administered Date(s) Administered   • COVID-19 (MODERNA) 03/16/2021, 04/19/2021   • Flu Vaccine Quad PF >18YRS 01/05/2016, 09/18/2020   • Flu Vaccine Quad PF >36MO 10/18/2017, 09/01/2019   • Hepatitis A 01/31/2019, 09/22/2020   • Tdap 02/15/2016        Health Maintenance Due   Topic Date Due   • INFLUENZA VACCINE  08/01/2021   • ANNUAL PHYSICAL  09/23/2021        Review of Systems   Constitutional: Negative for activity change, chills, fatigue, fever, unexpected weight gain and unexpected weight loss.   HENT: Negative for congestion, ear pain, hearing loss, postnasal drip and trouble swallowing.    Eyes: Negative for blurred vision and visual disturbance.   Respiratory: Negative for cough and shortness of breath.    Cardiovascular: Negative for chest pain, palpitations and leg swelling.   Gastrointestinal: Negative for abdominal pain, blood in stool, constipation, diarrhea and indigestion.   Endocrine: Negative for cold intolerance, heat intolerance, polydipsia and polyuria.   Genitourinary: Negative for difficulty urinating, discharge, erectile dysfunction and testicular pain.  "  Musculoskeletal: Positive for arthralgias. Negative for back pain, gait problem, joint swelling and myalgias.   Skin: Negative for skin lesions.   Allergic/Immunologic: Negative for environmental allergies.   Neurological: Negative for dizziness, syncope, speech difficulty, weakness, numbness, headache, memory problem and confusion.   Hematological: Negative for adenopathy. Does not bruise/bleed easily.   Psychiatric/Behavioral: Negative for decreased concentration, sleep disturbance, depressed mood and stress. The patient is not nervous/anxious.         Vital Signs  Vitals:    10/13/21 0830   BP: 134/94   BP Location: Left arm   Patient Position: Sitting   Cuff Size: Large Adult   Pulse: 70   Temp: 98.4 °F (36.9 °C)   TempSrc: Temporal   SpO2: 100%   Weight: 118 kg (261 lb 3.2 oz)   Height: 176 cm (69.29\")   PainSc: 0-No pain       Physical Exam  Vitals and nursing note reviewed.   Constitutional:       Appearance: He is well-developed. He is obese.   HENT:      Head: Normocephalic and atraumatic.      Right Ear: External ear normal.      Left Ear: External ear normal.      Nose: Nose normal.      Mouth/Throat:      Pharynx: No oropharyngeal exudate.   Eyes:      Conjunctiva/sclera: Conjunctivae normal.      Pupils: Pupils are equal, round, and reactive to light.   Neck:      Thyroid: No thyromegaly.      Vascular: No JVD.   Cardiovascular:      Rate and Rhythm: Normal rate and regular rhythm.      Heart sounds: Normal heart sounds. No murmur heard.  No friction rub. No gallop.    Pulmonary:      Effort: Pulmonary effort is normal. No respiratory distress.      Breath sounds: Normal breath sounds. No wheezing or rales.   Chest:      Chest wall: No tenderness.   Abdominal:      General: Bowel sounds are normal. There is no distension.      Palpations: Abdomen is soft. There is no mass.      Tenderness: There is no abdominal tenderness. There is no guarding or rebound.      Hernia: No hernia is present. "   Musculoskeletal:         General: No tenderness. Normal range of motion.      Cervical back: Normal range of motion and neck supple.   Lymphadenopathy:      Cervical: No cervical adenopathy.   Skin:     General: Skin is warm and dry.      Findings: No erythema or rash.   Neurological:      Mental Status: He is alert and oriented to person, place, and time.      Cranial Nerves: No cranial nerve deficit.      Sensory: No sensory deficit.      Motor: No abnormal muscle tone.      Coordination: Coordination normal.      Deep Tendon Reflexes: Reflexes normal.   Psychiatric:         Behavior: Behavior normal.         Thought Content: Thought content normal.         Judgment: Judgment normal.          Procedures     [facility] Risk Screen:  [facility].    Health Habits and Functional and Cognitive Screening:  No flowsheet data found.    Depression [place]  PHQ-9 Total Score:       ACE III MINI             Assessment/Plan:    Diagnoses and all orders for this visit:    1. Preventative health care (Primary)    2. Mixed hyperlipidemia  -     Lipid Panel; Future    3. Chronic chest pain with high risk for CAD  -     CT Cardiac Calcium Score Without Dye; Future    4. Family history of premature CAD  -     CT Cardiac Calcium Score Without Dye; Future    5. Benign essential hypertension    6. Morbidly obese (HCC)    7. Prediabetes    8. Stage 3a chronic kidney disease (HCC)    9. Panic disorder without agoraphobia    10. Fatty liver    11. NAYELY (obstructive sleep apnea)    Plan    Overall he remains in good health but has significant risk factors for coronary artery disease.  The presence of fatty liver, obesity, hypertension, prediabetes and hypertriglyceridemia represents a insulin resistant state.  He also has a very strong family history of premature coronary artery disease in his father.  For this reason I think CT for cardiac scoring is indicated.  If he has a moderate to heavy calcium  load statin and aspirin would be a good idea as with cardiology consultation.    Triglycerides are nearly 500, he will continue fenofibrate and try to lose at least 10 pounds over the next 3 months.  Plan to repeat lipid panel at that time.    Blood pressure is well controlled on losartan and enalapril.    Patient's Body mass index is 38.25 kg/m². indicating that he is morbidly obese (BMI > 40 or > 35 with obesity - related health condition). Obesity-related health conditions include the following: obstructive sleep apnea, hypertension and dyslipidemias. Obesity is worsening. BMI is is above average; BMI management plan is completed. We discussed portion control, increasing exercise and joining a fitness center or start home based exercise program..    A1c is 5.76, the treatment for this remains weight loss.    He will continue follow-up with nephrology for chronic kidney disease.    Panic disorder is in remission on escitalopram.    Liver function tests remain stable, the treatment for fatty liver remains weight loss.    He will continue CPAP for sleep apnea.      Labs  Results for orders placed or performed in visit on 10/12/21   Comprehensive Metabolic Panel    Specimen: Blood   Result Value Ref Range    Glucose 95 65 - 99 mg/dL    BUN 14 6 - 20 mg/dL    Creatinine 0.97 0.76 - 1.27 mg/dL    Sodium 137 136 - 145 mmol/L    Potassium 4.9 3.5 - 5.2 mmol/L    Chloride 99 98 - 107 mmol/L    CO2 25.5 22.0 - 29.0 mmol/L    Calcium 9.3 8.6 - 10.5 mg/dL    Total Protein 7.1 6.0 - 8.5 g/dL    Albumin 4.70 3.50 - 5.20 g/dL    ALT (SGPT) 66 (H) 1 - 41 U/L    AST (SGOT) 60 (H) 1 - 40 U/L    Alkaline Phosphatase 65 39 - 117 U/L    Total Bilirubin 0.5 0.0 - 1.2 mg/dL    eGFR Non African Amer 85 >60 mL/min/1.73    Globulin 2.4 gm/dL    A/G Ratio 2.0 g/dL    BUN/Creatinine Ratio 14.4 7.0 - 25.0    Anion Gap 12.5 5.0 - 15.0 mmol/L   Hemoglobin A1c    Specimen: Blood   Result Value Ref Range    Hemoglobin A1C 5.76 (H) 4.80 - 5.60 %    Hepatitis C Antibody    Specimen: Blood   Result Value Ref Range    Hepatitis C Ab Non-Reactive Non-Reactive   Lipid Panel    Specimen: Blood   Result Value Ref Range    Total Cholesterol 221 (H) 0 - 200 mg/dL    Triglycerides 478 (H) 0 - 150 mg/dL    HDL Cholesterol 31 (L) 40 - 60 mg/dL    LDL Cholesterol  108 (H) 0 - 100 mg/dL    VLDL Cholesterol 82 (H) 5 - 40 mg/dL    LDL/HDL Ratio 3.05    Microalbumin / Creatinine Urine Ratio - Urine, Clean Catch    Specimen: Urine, Clean Catch   Result Value Ref Range    Microalbumin/Creatinine Ratio      Creatinine, Urine 35.8 mg/dL    Microalbumin, Urine <1.2 mg/dL   CBC Auto Differential    Specimen: Blood   Result Value Ref Range    WBC 4.64 3.40 - 10.80 10*3/mm3    RBC 4.40 4.14 - 5.80 10*6/mm3    Hemoglobin 13.6 13.0 - 17.7 g/dL    Hematocrit 40.3 37.5 - 51.0 %    MCV 91.6 79.0 - 97.0 fL    MCH 30.9 26.6 - 33.0 pg    MCHC 33.7 31.5 - 35.7 g/dL    RDW 13.1 12.3 - 15.4 %    RDW-SD 43.9 37.0 - 54.0 fl    MPV 11.6 6.0 - 12.0 fL    Platelets 221 140 - 450 10*3/mm3    Neutrophil % 47.0 42.7 - 76.0 %    Lymphocyte % 38.4 19.6 - 45.3 %    Monocyte % 10.3 5.0 - 12.0 %    Eosinophil % 2.6 0.3 - 6.2 %    Basophil % 1.1 0.0 - 1.5 %    Immature Grans % 0.6 (H) 0.0 - 0.5 %    Neutrophils, Absolute 2.18 1.70 - 7.00 10*3/mm3    Lymphocytes, Absolute 1.78 0.70 - 3.10 10*3/mm3    Monocytes, Absolute 0.48 0.10 - 0.90 10*3/mm3    Eosinophils, Absolute 0.12 0.00 - 0.40 10*3/mm3    Basophils, Absolute 0.05 0.00 - 0.20 10*3/mm3    Immature Grans, Absolute 0.03 0.00 - 0.05 10*3/mm3    nRBC 0.2 0.0 - 0.2 /100 WBC        Counseling was given to patient for the following topics: appropriate exercise daily, disease prevention and healthy eating habits.    Plan of care reviewed with patient at the conclusion of today's visit. Education was provided regarding diagnosis, management, and any prescribed or recommended OTC medications.Patient verbalizes understanding of and agreement with management  plan.         Ramana Bautista MD

## 2021-11-11 ENCOUNTER — HOSPITAL ENCOUNTER (OUTPATIENT)
Dept: CT IMAGING | Facility: HOSPITAL | Age: 41
Discharge: HOME | End: 2021-11-11
Admitting: INTERNAL MEDICINE

## 2021-11-11 DIAGNOSIS — Z82.49 FAMILY HISTORY OF PREMATURE CAD: ICD-10-CM

## 2021-11-11 DIAGNOSIS — R07.9 CHRONIC CHEST PAIN WITH HIGH RISK FOR CAD: ICD-10-CM

## 2021-11-11 DIAGNOSIS — Z91.89 CHRONIC CHEST PAIN WITH HIGH RISK FOR CAD: ICD-10-CM

## 2021-11-11 DIAGNOSIS — G89.29 CHRONIC CHEST PAIN WITH HIGH RISK FOR CAD: ICD-10-CM

## 2021-11-11 PROCEDURE — 75571 CT HRT W/O DYE W/CA TEST: CPT

## 2021-12-27 RX ORDER — FENOFIBRATE 54 MG/1
54 TABLET ORAL DAILY
Qty: 30 TABLET | Refills: 5 | Status: SHIPPED | OUTPATIENT
Start: 2021-12-27 | End: 2022-04-25

## 2021-12-30 RX ORDER — LOSARTAN POTASSIUM 100 MG/1
100 TABLET ORAL DAILY
Qty: 30 TABLET | Refills: 3 | Status: SHIPPED | OUTPATIENT
Start: 2021-12-30

## 2021-12-30 RX ORDER — ENALAPRIL MALEATE 10 MG/1
10 TABLET ORAL DAILY
Qty: 30 TABLET | Refills: 3 | Status: SHIPPED | OUTPATIENT
Start: 2021-12-30 | End: 2022-04-25

## 2021-12-30 NOTE — TELEPHONE ENCOUNTER
Caller: Fili Mcclellan Chance    Relationship: Self    Best call back number: [phone]     Requested Prescriptions:   Requested Prescriptions     Pending Prescriptions Disp Refills   • losartan (COZAAR) 100 MG tablet  11     Sig: Take 1 tablet by mouth.   • enalapril (VASOTEC) 10 MG tablet  3     Sig: Take 1 tablet by mouth Daily.        Pharmacy where request should be sent: [facility], [place] [address] [address] - [phone] [facility] [phone] FX     Additional details provided by patient: PATIENT HAS TWO DAYS LEFT, PATIENT ASKED FOR A 30 DAY REFILL UNTIL HE CAN GET IN TO SEE THE SPECIALIST ON 1-17-21.  PATIENT ALSO ASKED IF HE IS NOT ABLE TO GET THE LOSARTAN IS THERE AN ALTERNATIVE.    Does the patient have less than a 3 day supply:  [x] Yes  [] No    Dm Greenwood Rep   12/30/21 10:14 EST

## 2022-04-25 RX ORDER — FENOFIBRATE 54 MG/1
TABLET ORAL
Qty: 30 TABLET | Refills: 5 | Status: SHIPPED | OUTPATIENT
Start: 2022-04-25 | End: 2022-12-19

## 2022-04-25 RX ORDER — ENALAPRIL MALEATE 10 MG/1
10 TABLET ORAL DAILY
Qty: 30 TABLET | Refills: 5 | Status: SHIPPED | OUTPATIENT
Start: 2022-04-25 | End: 2022-10-19

## 2022-04-25 NOTE — TELEPHONE ENCOUNTER
Rx Refill Note  Requested Prescriptions     Pending Prescriptions Disp Refills   • enalapril (VASOTEC) 10 MG tablet [Pharmacy Med Name: ENALAPRIL MALEATE 10 MG TAB 10 Tablet] 30 tablet 3     Sig: TAKE 1 TABLET BY MOUTH DAILY.      Last office visit with prescribing clinician: 10/13/21  Next office visit with prescribing clinician: 10/18/22           Adrianne Nur RN  04/25/22, 09:57 EDT

## 2022-05-02 ENCOUNTER — TELEPHONE (OUTPATIENT)
Dept: INTERNAL MEDICINE | Facility: CLINIC | Age: 42
End: 2022-05-02

## 2022-05-02 DIAGNOSIS — E78.2 MIXED HYPERLIPIDEMIA: Primary | ICD-10-CM

## 2022-05-02 DIAGNOSIS — R73.03 PREDIABETES: ICD-10-CM

## 2022-05-02 NOTE — TELEPHONE ENCOUNTER
Patient called and stated he had lost some weight and that you would order some labs on him.  No labs in system.      Please call him when labs are in system at [phone]

## 2022-05-06 ENCOUNTER — LAB (OUTPATIENT)
Dept: LAB | Facility: HOSPITAL | Age: 42
End: 2022-05-06

## 2022-05-06 DIAGNOSIS — E78.2 MIXED HYPERLIPIDEMIA: ICD-10-CM

## 2022-05-06 DIAGNOSIS — R73.03 PREDIABETES: ICD-10-CM

## 2022-05-06 LAB
ALBUMIN SERPL-MCNC: 4.8 G/DL (ref 3.5–5.2)
ALBUMIN/GLOB SERPL: 1.8 G/DL
ALP SERPL-CCNC: 61 U/L (ref 39–117)
ALT SERPL W P-5'-P-CCNC: 67 U/L (ref 1–41)
ANION GAP SERPL CALCULATED.3IONS-SCNC: 11 MMOL/L (ref 5–15)
AST SERPL-CCNC: 40 U/L (ref 1–40)
BILIRUB SERPL-MCNC: 0.5 MG/DL (ref 0–1.2)
BUN SERPL-MCNC: 19 MG/DL (ref 6–20)
BUN/CREAT SERPL: 19.2 (ref 7–25)
CALCIUM SPEC-SCNC: 9.6 MG/DL (ref 8.6–10.5)
CHLORIDE SERPL-SCNC: 103 MMOL/L (ref 98–107)
CHOLEST SERPL-MCNC: 214 MG/DL (ref 0–200)
CO2 SERPL-SCNC: 24 MMOL/L (ref 22–29)
CREAT SERPL-MCNC: 0.99 MG/DL (ref 0.76–1.27)
EGFRCR SERPLBLD CKD-EPI 2021: 96.9 ML/MIN/1.73
GLOBULIN UR ELPH-MCNC: 2.7 GM/DL
GLUCOSE SERPL-MCNC: 94 MG/DL (ref 65–99)
HBA1C MFR BLD: 5.6 % (ref 4.8–5.6)
HDLC SERPL-MCNC: 30 MG/DL (ref 40–60)
LDLC SERPL CALC-MCNC: 136 MG/DL (ref 0–100)
LDLC/HDLC SERPL: 4.37 {RATIO}
POTASSIUM SERPL-SCNC: 4.6 MMOL/L (ref 3.5–5.2)
PROT SERPL-MCNC: 7.5 G/DL (ref 6–8.5)
SODIUM SERPL-SCNC: 138 MMOL/L (ref 136–145)
TRIGL SERPL-MCNC: 264 MG/DL (ref 0–150)
VLDLC SERPL-MCNC: 48 MG/DL (ref 5–40)

## 2022-05-06 PROCEDURE — 82172 ASSAY OF APOLIPOPROTEIN: CPT

## 2022-05-06 PROCEDURE — 80053 COMPREHEN METABOLIC PANEL: CPT

## 2022-05-06 PROCEDURE — 83036 HEMOGLOBIN GLYCOSYLATED A1C: CPT

## 2022-05-06 PROCEDURE — 80061 LIPID PANEL: CPT

## 2022-05-06 PROCEDURE — 36415 COLL VENOUS BLD VENIPUNCTURE: CPT

## 2022-05-08 LAB — APO B SERPL-MCNC: 129 MG/DL

## 2022-05-16 DIAGNOSIS — E78.2 MIXED HYPERLIPIDEMIA: Primary | ICD-10-CM

## 2022-06-29 ENCOUNTER — TELEMEDICINE (OUTPATIENT)
Dept: INTERNAL MEDICINE | Facility: CLINIC | Age: 42
End: 2022-06-29

## 2022-06-29 VITALS — TEMPERATURE: 102 F

## 2022-06-29 DIAGNOSIS — R19.7 DIARRHEA OF PRESUMED INFECTIOUS ORIGIN: Primary | ICD-10-CM

## 2022-06-29 DIAGNOSIS — I10 BENIGN ESSENTIAL HYPERTENSION: ICD-10-CM

## 2022-06-29 PROCEDURE — 99213 OFFICE O/P EST LOW 20 MIN: CPT | Performed by: NURSE PRACTITIONER

## 2022-06-29 RX ORDER — DIPHENOXYLATE HYDROCHLORIDE AND ATROPINE SULFATE 2.5; .025 MG/1; MG/1
1 TABLET ORAL 4 TIMES DAILY PRN
Qty: 15 TABLET | Refills: 0 | Status: SHIPPED | OUTPATIENT
Start: 2022-06-29

## 2022-06-29 RX ORDER — ONDANSETRON 8 MG/1
8 TABLET, ORALLY DISINTEGRATING ORAL EVERY 8 HOURS PRN
Qty: 15 TABLET | Refills: 0 | Status: SHIPPED | OUTPATIENT
Start: 2022-06-29

## 2022-06-29 NOTE — PROGRESS NOTES
Follow Up Office Visit      Patient Name: Fili Mcclellan  : 1980   MRN: [id number]   Care Team: Patient Care Team:  Ramana Bautista MD as PCP - General  Ramana Bautista MD as PCP - Family Medicine    Chief Complaint:    Chief Complaint   Patient presents with   • Diarrhea     You have chosen to receive care through a telehealth visit.  Do you consent to use a video/audio connection for your medical care today?     YES      History of Present Illness: Fili Mcclellan is a 41 y.o. male with pertinent medical history significant for hypertension, hyperlipidemia, obesity, fatty liver, chronic kidney disease stage III, panic disorder, NAYELY.    He presents today via A/V visit for acute new issue of fever, chills and diarrhea.    States that symptoms started abruptly on Monday.  Having multiple episodes of watery stools even with sips of fluids.      He has general body aches, fatigue and chills with a T-max last night of 102.     Notes that he has tried over-the-counter Pepto-Bismol with little relief of symptoms.    He has been trying to stay hydrated with Gatorade and Pedialyte.  Noted that he tolerated Pedialyte a bit better today.    Some intermittent episodes of nausea but the denies vomiting.    Noted that he was taken to COVID test at home that were both negative.    States that he felt as though he may have gotten sick from some cheese he ate  that his spouse now has similar symptoms and she did not eat the same foods.        Subjective      Review of Systems:   Review of Systems   Constitutional: Positive for chills, fatigue and fever.   Respiratory: Negative for cough and shortness of breath.    Gastrointestinal: Positive for diarrhea and nausea. Negative for abdominal pain and vomiting.   Musculoskeletal: Positive for myalgias.       I have reviewed and the following portions of the patient's history were updated as appropriate: past family history, past medical history,  past social history, past surgical history and problem list.    Medications:     Current Outpatient Medications:   •  enalapril (VASOTEC) 10 MG tablet, TAKE 1 TABLET BY MOUTH DAILY., Disp: 30 tablet, Rfl: 5  •  escitalopram (LEXAPRO) 10 MG tablet, TAKE 1 TABLET BY MOUTH DAILY., Disp: 30 tablet, Rfl: 11  •  fenofibrate (TRICOR) 54 MG tablet, TAKE 1 TABLET BY MOUTH EVERY DAY, Disp: 30 tablet, Rfl: 5  •  losartan (COZAAR) 100 MG tablet, Take 1 tablet by mouth Daily., Disp: 30 tablet, Rfl: 3  •  diphenoxylate-atropine (Lomotil) 2.5-0.025 MG per tablet, Take 1 tablet by mouth 4 (Four) Times a Day As Needed for Diarrhea., Disp: 15 tablet, Rfl: 0  •  ondansetron ODT (Zofran ODT) 8 MG disintegrating tablet, Place 1 tablet on the tongue Every 8 (Eight) Hours As Needed for Nausea or Vomiting., Disp: 15 tablet, Rfl: 0    Allergies:   Allergies   Allergen Reactions   • Shellfish-Derived Products Anaphylaxis     Edema   • Iodides Other (See Comments)     Tries to be careful about it with one kidney and sensitivity to shellfish       Objective     Physical Exam:  Vital Signs:   Vitals:    06/29/22 1533   Temp: (!) 102 °F (38.9 °C)     There is no height or weight on file to calculate BMI.     Physical Exam  Constitutional: No acute distress, awake, alert, nontoxic, normal body habitus  HENT: no conjunctival injection  Respiratory: Good effort, nonlabored respirations   Psychiatric: Appropriate affect, good insight and judgement, cooperative  Neurologic: Oriented x 3, speech clear and fluent  Skin: No visible rashes, no jaundice seen on exposed skin through video window    Assessment / Plan      Assessment/Plan:   Problems Addressed This Visit    ICD-10-CM ICD-9-CM   1. Diarrhea of presumed infectious origin  R19.7 009.3   2. Benign essential hypertension  I10 401.1        Diarrhea  Fever  Fatigue and malaise  -Presumed likely viral gastroenteritis  -Advised to hydrate well, rest  -[place] diet  -Lomotil 4 times daily as  needed  -Zofran 8 mg ODT every 8 hours as needed    -Follow-up if symptoms persist greater than 7 days as may need further work-up    Hypertension  -Continue enalapril and losartan as prescribed  -Encourage patient to monitor BP during this time since there is the issue of likely dehydration       Plan of care reviewed with patient at the conclusion of today's visit. Education was provided regarding diagnosis and management.  Patient verbalizes understanding of and agreement with management plan.      Follow Up:   Return if symptoms worsen or fail to improve, for Next scheduled follow up.        ROBERTO CARLOS Johnson  [facility] [address] [facility]    Please note that portions of this note were completed with a voice recognition program.

## 2022-07-06 ENCOUNTER — TELEPHONE (OUTPATIENT)
Dept: INTERNAL MEDICINE | Facility: CLINIC | Age: 42
End: 2022-07-06

## 2022-07-06 RX ORDER — SULFAMETHOXAZOLE AND TRIMETHOPRIM 800; 160 MG/1; MG/1
1 TABLET ORAL 2 TIMES DAILY
Qty: 14 TABLET | Refills: 0 | Status: SHIPPED | OUTPATIENT
Start: 2022-07-06

## 2022-07-06 NOTE — TELEPHONE ENCOUNTER
Spoke to pt and he said the family had been to [place], [place] and when they returned they all got sick w/in days of each other. They learned there was a listeria outbreak from ice cream there. His wife went to [facility] and was given Bactrim and the kids were given a z-pack. He said he is some better, but didn't know if he should be on an antibiotic as well

## 2022-07-06 NOTE — TELEPHONE ENCOUNTER
PT HAS A VIDEO VISIT LAST WEEK AND WAS PUT ON LOMOTIL.  PT IS FEELING BETTER BUT NOT THE GREATEST.  NO DIARRHEA, JUST FEEL BLAH.  PT STATES THE WHOLE FAMILY HAS HAD THIS STOMACH BUG AND HAS BEEN ON ANTIBIOTICS AND WANTS TO KNOW IF HE SHOULD BE TAKING THE ANTIBIOTICS AS WELL.

## 2022-07-06 NOTE — TELEPHONE ENCOUNTER
I was wondering if family members had been diagnosed with a specific bacterial infection.  Usually antibiotics are not required.

## 2022-07-22 ENCOUNTER — TELEMEDICINE (OUTPATIENT)
Dept: SLEEP MEDICINE | Facility: CLINIC | Age: 42
End: 2022-07-22

## 2022-07-22 DIAGNOSIS — G47.33 OBSTRUCTIVE SLEEP APNEA, ADULT: Primary | ICD-10-CM

## 2022-07-22 PROCEDURE — 99213 OFFICE O/P EST LOW 20 MIN: CPT | Performed by: NURSE PRACTITIONER

## 2022-07-22 NOTE — PROGRESS NOTES
Sleep Clinic Video Visit Follow Up Note    You have chosen to receive care through a telehealth visit.  Do you consent to use a video/audio connection for your medical care today? Yes       Chief Complaint  NAYELY Pap therapy followup    Subjective     History of Present Illness (from initial consult):  Patient states she had a history of snoring and is gained weight recently.  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  He is referred here for further evaluation.  He has a history of snoring loudly for about 10 years.  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  He is been on blood pressure medicines for many years.  He is not been noted to have apneas.  He rarely awakens gasping for breath.  He says he sometimes rested in the mornings although occasionally not.  He denies any morning headaches.  He sleepy if sitting quietly during the day.  He gets sleepy driving.  He's been told he had a deviated septum and has broken his nose playing sports.     He says he usually sleeps on his stomach and doesn't feel like he can sleep on his back.  He does have trouble breathing through his nose both day and night.  He denies any reflux symptoms he denies hypnagogic hallucinations sleep paralysis.  He denies kicking or jerking his legs at night.  He denies chronic pain.  He is gained about 10 pounds in the past year.     He goes to bed about 9:30 and will fall asleep within 20 minutes.  He awakens 3-4 times during the night.  He thinks he gets 7-8 hours of sleep arising at 6:30 in the morning.  He is sometimes rested although sometimes not.  He has a history of hypertension known since college.  He denies any history of diabetes coronary artery disease.  He has a history of prostatitis.  (End text).      Interval History:  He returns for follow up fo NAYELY.  He was last seen on 7/23 21.  At that time he  was doing well with an Ripon score of 4/24.  This has improved to 2/24.  He continues to do well and has not slept a day without it. He did get the new [employer] machine after recall. He has no issues. He will sleep 7-8  Hours. He is considering a travel machine.     Further details are as follows:    Ripon Scale is: 2/24    Wt down to 237 goal under 200 lbs  Weight:  Weight change in the last year:  loss: 23 lbs    The patient's relevant past medical, surgical, family, and social history reviewed and updated in Epic as appropriate.    PMH:    Past Medical History:   Diagnosis Date   • Closed traumatic nondisplaced fracture of two ribs of left side with routine healing 2/9/2021    Request for ER records and scan.  Discussed pain management with patient.  Trial of tramadol 50 mg up to every 8 hours #30.  Will call and check in on him on Friday.  He is encouraged to continue lidocaine patches, as he states this does help with pain.  Warm moist heat as tolerated. He can use Tylenol with tramadol to help with pain management.   • Hypertension      Past Surgical History:   Procedure Laterality Date   • HERNIA REPAIR         Allergies   Allergen Reactions   • Shellfish-Derived Products Anaphylaxis     Edema   • Iodides Other (See Comments)     Tries to be careful about it with one kidney and sensitivity to shellfish       MEDS:  Prior to Admission medications    Medication Sig Start Date End Date Taking? Authorizing Provider   diphenoxylate-atropine (Lomotil) 2.5-0.025 MG per tablet Take 1 tablet by mouth 4 (Four) Times a Day As Needed for Diarrhea. 6/29/22   Yasmeen Zepeda APRN   enalapril (VASOTEC) 10 MG tablet TAKE 1 TABLET BY MOUTH DAILY. 4/25/22   Ramana Bautista MD   escitalopram (LEXAPRO) 10 MG tablet TAKE 1 TABLET BY MOUTH DAILY. 8/30/21   Ramana Bautista MD   fenofibrate (TRICOR) 54 MG tablet TAKE 1 TABLET BY MOUTH EVERY DAY 4/25/22   Ramana Bautista MD   losartan (COZAAR) 100 MG  "tablet Take 1 tablet by mouth Daily. 12/30/21   Vivek Alvarez MD   ondansetron ODT (Zofran ODT) 8 MG disintegrating tablet Place 1 tablet on the tongue Every 8 (Eight) Hours As Needed for Nausea or Vomiting. 6/29/22   Yasmeen Zepeda APRN   sulfamethoxazole-trimethoprim (BACTRIM DS,SEPTRA DS) 800-160 MG per tablet Take 1 tablet by mouth 2 (Two) Times a Day. 7/6/22   Ramana Bautista MD         FH:  Family History   Problem Relation Age of Onset   • Stroke Other    • Hypertension Other    • Breast cancer Mother    • Heart disease Father    • Hypertension Father        Objective   Vital Signs:  There were no vitals taken for this visit.          Physical Exam        CPAP Report:  AHI: 1.6  Days of Usage: 4/20/2021-7/19/2021 (90 days)-99.4%  90-95% Pressure: 10.9 cm H2O  Number of Days Greater than 4 hours: 9 3.3%  Settings: 8 cm H2O min, 18 cm H2O Max    Result Review :              Assessment and Plan  This is a 41-year-old gentleman with obstructive sleep apnea.  He is doing well with the device and states he \"cannot sleep without it\".  He does have some questions regarding obtaining a travel machine and will call us if he would like to get an order for it.  Otherwise his AHI was 1.6, and he has been using the machine greater than 4 hours 93.3% of the time.  We will refill his Pap supplies for the year and he will follow-up in 1 year or call with questions or concerns prior to this.    Diagnoses and all orders for this visit:    1. Obstructive sleep apnea, adult (Primary)  -     PAP Therapy    Patient continues to work on his weight as well.  He is on a diet and has lost approximately 23 pounds thus far.  He continues to work on his weight with a goal of reaching approximately 200 pounds.         The patient continues to use and benefit from CPAP therapy.    Follow Up  Return in about 1 year (around 7/22/2023) for Annual visit.  Patient was given instructions and counseling regarding his condition or " for health maintenance advice. Please see specific information pulled into the AVS if appropriate.       ROBERTO CARLOS Green, [facility]-BC  Pulmonology, Critical Care, and Sleep Medicine  Electronically signed by ROBERTO CARLOS Reynolds, 07/22/22, 10:00 AM EDT.

## 2022-08-24 RX ORDER — ESCITALOPRAM OXALATE 10 MG/1
10 TABLET ORAL DAILY
Qty: 30 TABLET | Refills: 11 | Status: SHIPPED | OUTPATIENT
Start: 2022-08-24

## 2022-10-18 NOTE — TELEPHONE ENCOUNTER
Rx Refill Note  Requested Prescriptions     Pending Prescriptions Disp Refills   • enalapril (VASOTEC) 10 MG tablet [Pharmacy Med Name: ENALAPRIL MALEATE 10 MG TAB 10 Tablet] 30 tablet 5     Sig: TAKE 1 TABLET BY MOUTH DAILY.      Last office visit with prescribing clinician: 10/13/2021      Next office visit with prescribing clinician: Visit date not found            Sabine Crain LPN  10/18/22, 15:51 EDT

## 2022-10-19 RX ORDER — ENALAPRIL MALEATE 10 MG/1
10 TABLET ORAL DAILY
Qty: 30 TABLET | Refills: 5 | Status: SHIPPED | OUTPATIENT
Start: 2022-10-19

## 2022-12-19 RX ORDER — FENOFIBRATE 54 MG/1
TABLET ORAL
Qty: 30 TABLET | Refills: 5 | Status: SHIPPED | OUTPATIENT
Start: 2022-12-19

## 2023-05-02 RX ORDER — FENOFIBRATE 54 MG/1
TABLET ORAL
Qty: 30 TABLET | Refills: 5 | Status: SHIPPED | OUTPATIENT
Start: 2023-05-02

## 2023-07-21 NOTE — PROGRESS NOTES
"Sleep Clinic Video Visit Follow Up Note    You have chosen to receive care through a telehealth visit.  Do you consent to use a video connection for your medical care today? Yes       Chief Complaint  Follow-up and compliance of PAP therapy    Subjective     History of Present Illness (from previous encounter on 7/22/2022):  This is a 41-year-old gentleman with obstructive sleep apnea. He is doing well with the device and states he \"cannot sleep without it\". He does have some questions regarding obtaining a travel machine and will call us if he would like to get an order for it. Otherwise his AHI was 1.6, and he has been using the machine greater than 4 hours 93.3% of the time. We will refill his Pap supplies for the year and he will follow-up in 1 year or call with questions or concerns prior to this.   (End copied text)    Interval History:  Fili Mcclellan is a 42 y.o. male returns for follow up and compliance of CPAP therapy. The patient was last seen on 7/22/2022. Overall the patient feels good with regard to therapy. The device appears to be working appropriately. On average the patient sleeps 7-9 hours per night. The patient wake 1-2 times per night.     The patient reports the following changes to their medical and medication history since they were last seen:  None        Further details are as follows:    Sidell Scale is: 1/24      Weight:  Current Weight: 260 lb    Weight change in the last year:  gain: 0 lbs    The patient's relevant past medical, surgical, family, and social history reviewed and updated in Epic as appropriate.    PMH:    Past Medical History:   Diagnosis Date    Closed traumatic nondisplaced fracture of two ribs of left side with routine healing 2/9/2021    Request for ER records and scan.  Discussed pain management with patient.  Trial of tramadol 50 mg up to every 8 hours #30.  Will call and check in on him on Friday.  He is encouraged to continue lidocaine patches, as he states " "this does help with pain.  Warm moist heat as tolerated. He can use Tylenol with tramadol to help with pain management.    Hypertension      Past Surgical History:   Procedure Laterality Date    HERNIA REPAIR         Allergies   Allergen Reactions    Shellfish-Derived Products Anaphylaxis     Edema    Iodides Other (See Comments)     Tries to be careful about it with one kidney and sensitivity to shellfish       MEDS:  Prior to Admission medications    Medication Sig Start Date End Date Taking? Authorizing Provider   diphenoxylate-atropine (Lomotil) 2.5-0.025 MG per tablet Take 1 tablet by mouth 4 (Four) Times a Day As Needed for Diarrhea. 6/29/22   Yasmeen Zepeda APRN   enalapril (VASOTEC) 10 MG tablet TAKE 1 TABLET BY MOUTH DAILY. 10/19/22   Ramana Bautista MD   escitalopram (LEXAPRO) 10 MG tablet TAKE 1 TABLET BY MOUTH DAILY. 8/24/22   Ramana Bautista MD   fenofibrate (TRICOR) 54 MG tablet TAKE 1 TABLET BY MOUTH EVERY DAY 5/2/23   Ramana Bautista MD   losartan (COZAAR) 100 MG tablet Take 1 tablet by mouth Daily. 12/30/21   Vivek Alvarez MD   ondansetron ODT (Zofran ODT) 8 MG disintegrating tablet Place 1 tablet on the tongue Every 8 (Eight) Hours As Needed for Nausea or Vomiting. 6/29/22   Yasmeen Zepeda APRN   sulfamethoxazole-trimethoprim (BACTRIM DS,SEPTRA DS) 800-160 MG per tablet Take 1 tablet by mouth 2 (Two) Times a Day. 7/6/22   Ramana Bautista MD         FH:  Family History   Problem Relation Age of Onset    Stroke Other     Hypertension Other     Breast cancer Mother     Heart disease Father     Hypertension Father        Objective   Vital Signs:  Ht 177.8 cm (70\")   Wt 118 kg (260 lb)   BMI 37.31 kg/m²     Class 2 Severe Obesity (BMI >=35 and <=39.9). Obesity-related health conditions include the following: obstructive sleep apnea. Obesity is improving with lifestyle modifications. BMI is is above average; BMI management plan is completed. We discussed " portion control and increasing exercise. He is walking about 4 miles 4-5 times per week.         Physical Exam  Constitutional:       Appearance: Normal appearance.   Neurological:      Mental Status: He is alert and oriented to person, place, and time.   Psychiatric:         Mood and Affect: Mood normal.         Behavior: Behavior normal.         Thought Content: Thought content normal.             Result Review :           CPAP Report:  AHI: 1.6/H  Days of Usage: 87/90 (96.7%)  90th Percentile Pressure: 12.3 cm H2O  Number of Days Greater than 4 hours: 87/90 (96.7%)  Settings: Auto CPAP-a flex-8/18 cm H2O, a flex-3, ramp time 30 minutes, ramp start pressure 8 cm H2O.       Assessment and Plan  Fili Mcclellan is a 42 y.o. male who returns for follow-up compliance of PAP therapy.  Pap report has been reviewed.  Overall usage is at 96.7%, with greater than 4-hour compliance also at 96.7%.  Sleep apnea is well controlled with an AHI of 1.6/H.  I will refill the patient's supplies, and they will return for follow-up and compliance in 1 year or sooner should they have further questions or concerns.  Patient has been working on his weight with exercise.  He is interested in a travel device.  I have placed an order for travel device today.      Diagnoses and all orders for this visit:    1. Obstructive sleep apnea, adult (Primary)  -     PAP Therapy  -     PAP Therapy    2. Class 2 obesity with body mass index (BMI) of 36.0 to 36.9 in adult, unspecified obesity type, unspecified whether serious comorbidity present             The patient continues to use and benefit from CPAP therapy.    1. The patient was counseled regarding multimodal approach with healthy nutrition, healthy sleep, regular physical activity, social activities, counseling, and medications. Encouraged to practice lateral sleep position. Avoid alcohol and sedatives close to bedtime.     2.  We will refill supplies x1 year.  Return to clinic 1 year or  sooner if symptoms warrant.  Patient gave verbal consent today for video visit. I have reviewed the results of my evaluation and impression and discussed my recommendations in detail with the patient.         Follow Up  Return in about 1 year (around 7/24/2024) for Annual visit.  Patient was given instructions and counseling regarding his condition or for health maintenance advice. Please see specific information pulled into the AVS if appropriate.       ROBERTO CARLOS Green, ACNP-BC  Pulmonology, Critical Care, and Sleep Medicine

## 2023-07-24 ENCOUNTER — TELEMEDICINE (OUTPATIENT)
Dept: SLEEP MEDICINE | Facility: CLINIC | Age: 43
End: 2023-07-24
Payer: COMMERCIAL

## 2023-07-24 VITALS — WEIGHT: 260 LBS | HEIGHT: 70 IN | BODY MASS INDEX: 37.22 KG/M2

## 2023-07-24 DIAGNOSIS — G47.33 OBSTRUCTIVE SLEEP APNEA, ADULT: Primary | ICD-10-CM

## 2023-07-24 DIAGNOSIS — E66.9 CLASS 2 OBESITY WITH BODY MASS INDEX (BMI) OF 36.0 TO 36.9 IN ADULT, UNSPECIFIED OBESITY TYPE, UNSPECIFIED WHETHER SERIOUS COMORBIDITY PRESENT: ICD-10-CM

## 2023-07-24 PROCEDURE — 99213 OFFICE O/P EST LOW 20 MIN: CPT | Performed by: NURSE PRACTITIONER

## 2023-08-08 ENCOUNTER — TELEPHONE (OUTPATIENT)
Dept: INTERNAL MEDICINE | Facility: CLINIC | Age: 43
End: 2023-08-08
Payer: COMMERCIAL

## 2023-08-08 RX ORDER — ESCITALOPRAM OXALATE 10 MG/1
TABLET ORAL
Qty: 30 TABLET | Refills: 2 | Status: SHIPPED | OUTPATIENT
Start: 2023-08-08

## 2023-08-08 NOTE — TELEPHONE ENCOUNTER
Rx Refill Note  Requested Prescriptions     Pending Prescriptions Disp Refills    escitalopram (LEXAPRO) 10 MG tablet [Pharmacy Med Name: ESCITALOPRAM 10 MG TAB 10 Tablet] 30 tablet 11     Sig: TAKE ONE TABLET BY MOUTH EVERY DAY      Last office visit with prescribing clinician: 10/13/2021   Last telemedicine visit with prescribing clinician: Visit date not found   Next office visit with prescribing clinician: Visit date not found                             Adrianne Nur RN  08/08/23, 10:10 EDT

## 2023-08-08 NOTE — TELEPHONE ENCOUNTER
Patient returned call, I read him the message from Dr Bautista and he verbalized understanding. Physical scheduled with Dr Bautista 9/13/2023 at 4:00 PM

## 2023-08-08 NOTE — TELEPHONE ENCOUNTER
I renewed his lexapro with 2 refills, but remind him I have not seen him in almost 2 years, so he needs annual with labs before the end of the year.

## 2023-10-27 ENCOUNTER — TELEPHONE (OUTPATIENT)
Dept: INTERNAL MEDICINE | Facility: CLINIC | Age: 43
End: 2023-10-27
Payer: COMMERCIAL

## 2023-10-27 DIAGNOSIS — E78.2 MIXED HYPERLIPIDEMIA: ICD-10-CM

## 2023-10-27 DIAGNOSIS — I10 BENIGN ESSENTIAL HYPERTENSION: Primary | ICD-10-CM

## 2023-10-27 DIAGNOSIS — R73.03 PREDIABETES: ICD-10-CM

## 2023-10-30 ENCOUNTER — LAB (OUTPATIENT)
Dept: LAB | Facility: HOSPITAL | Age: 43
End: 2023-10-30
Payer: COMMERCIAL

## 2023-10-30 ENCOUNTER — OFFICE VISIT (OUTPATIENT)
Dept: INTERNAL MEDICINE | Facility: CLINIC | Age: 43
End: 2023-10-30
Payer: COMMERCIAL

## 2023-10-30 VITALS
BODY MASS INDEX: 38.6 KG/M2 | WEIGHT: 260.6 LBS | TEMPERATURE: 98.2 F | DIASTOLIC BLOOD PRESSURE: 88 MMHG | SYSTOLIC BLOOD PRESSURE: 124 MMHG | HEIGHT: 69 IN | HEART RATE: 76 BPM

## 2023-10-30 DIAGNOSIS — I10 PRIMARY HYPERTENSION: ICD-10-CM

## 2023-10-30 DIAGNOSIS — R53.83 OTHER FATIGUE: ICD-10-CM

## 2023-10-30 DIAGNOSIS — Z00.00 PREVENTATIVE HEALTH CARE: Primary | ICD-10-CM

## 2023-10-30 DIAGNOSIS — E78.2 MIXED HYPERLIPIDEMIA: ICD-10-CM

## 2023-10-30 DIAGNOSIS — F41.0 PANIC DISORDER WITHOUT AGORAPHOBIA: ICD-10-CM

## 2023-10-30 DIAGNOSIS — R73.03 PREDIABETES: ICD-10-CM

## 2023-10-30 DIAGNOSIS — K76.0 FATTY LIVER: ICD-10-CM

## 2023-10-30 DIAGNOSIS — H92.03 DISCOMFORT OF BOTH EARS: ICD-10-CM

## 2023-10-30 DIAGNOSIS — M79.10 MYALGIA: ICD-10-CM

## 2023-10-30 DIAGNOSIS — I10 BENIGN ESSENTIAL HYPERTENSION: ICD-10-CM

## 2023-10-30 DIAGNOSIS — Z90.5 SOLITARY KIDNEY, ACQUIRED: ICD-10-CM

## 2023-10-30 LAB
ALBUMIN SERPL-MCNC: 4.8 G/DL (ref 3.5–5.2)
ALBUMIN UR-MCNC: 5.6 MG/DL
ALBUMIN/GLOB SERPL: 2.2 G/DL
ALP SERPL-CCNC: 59 U/L (ref 39–117)
ALT SERPL W P-5'-P-CCNC: 35 U/L (ref 1–41)
ANION GAP SERPL CALCULATED.3IONS-SCNC: 8.7 MMOL/L (ref 5–15)
AST SERPL-CCNC: 28 U/L (ref 1–40)
BASOPHILS # BLD AUTO: 0.04 10*3/MM3 (ref 0–0.2)
BASOPHILS NFR BLD AUTO: 0.7 % (ref 0–1.5)
BILIRUB SERPL-MCNC: 0.6 MG/DL (ref 0–1.2)
BUN SERPL-MCNC: 10 MG/DL (ref 6–20)
BUN/CREAT SERPL: 10.6 (ref 7–25)
CALCIUM SPEC-SCNC: 9.7 MG/DL (ref 8.6–10.5)
CHLORIDE SERPL-SCNC: 99 MMOL/L (ref 98–107)
CHOLEST SERPL-MCNC: 214 MG/DL (ref 0–200)
CO2 SERPL-SCNC: 28.3 MMOL/L (ref 22–29)
CREAT SERPL-MCNC: 0.94 MG/DL (ref 0.76–1.27)
CREAT UR-MCNC: 93.7 MG/DL
DEPRECATED RDW RBC AUTO: 41.3 FL (ref 37–54)
EGFRCR SERPLBLD CKD-EPI 2021: 102.5 ML/MIN/1.73
EOSINOPHIL # BLD AUTO: 0.12 10*3/MM3 (ref 0–0.4)
EOSINOPHIL NFR BLD AUTO: 2.2 % (ref 0.3–6.2)
ERYTHROCYTE [DISTWIDTH] IN BLOOD BY AUTOMATED COUNT: 13.1 % (ref 12.3–15.4)
ERYTHROCYTE [SEDIMENTATION RATE] IN BLOOD: 14 MM/HR (ref 0–15)
GLOBULIN UR ELPH-MCNC: 2.2 GM/DL
GLUCOSE SERPL-MCNC: 94 MG/DL (ref 65–99)
HBA1C MFR BLD: 5.9 % (ref 4.8–5.6)
HCT VFR BLD AUTO: 40 % (ref 37.5–51)
HDLC SERPL-MCNC: 36 MG/DL (ref 40–60)
HGB BLD-MCNC: 13.5 G/DL (ref 13–17.7)
IMM GRANULOCYTES # BLD AUTO: 0.05 10*3/MM3 (ref 0–0.05)
IMM GRANULOCYTES NFR BLD AUTO: 0.9 % (ref 0–0.5)
LDLC SERPL CALC-MCNC: 106 MG/DL (ref 0–100)
LDLC/HDLC SERPL: 2.61 {RATIO}
LYMPHOCYTES # BLD AUTO: 2.01 10*3/MM3 (ref 0.7–3.1)
LYMPHOCYTES NFR BLD AUTO: 36.6 % (ref 19.6–45.3)
MCH RBC QN AUTO: 29.5 PG (ref 26.6–33)
MCHC RBC AUTO-ENTMCNC: 33.8 G/DL (ref 31.5–35.7)
MCV RBC AUTO: 87.3 FL (ref 79–97)
MICROALBUMIN/CREAT UR: 59.8 MG/G (ref 0–29)
MONOCYTES # BLD AUTO: 0.48 10*3/MM3 (ref 0.1–0.9)
MONOCYTES NFR BLD AUTO: 8.7 % (ref 5–12)
NEUTROPHILS NFR BLD AUTO: 2.79 10*3/MM3 (ref 1.7–7)
NEUTROPHILS NFR BLD AUTO: 50.9 % (ref 42.7–76)
NRBC BLD AUTO-RTO: 0 /100 WBC (ref 0–0.2)
PLATELET # BLD AUTO: 219 10*3/MM3 (ref 140–450)
PMV BLD AUTO: 11.4 FL (ref 6–12)
POTASSIUM SERPL-SCNC: 4.4 MMOL/L (ref 3.5–5.2)
PROT SERPL-MCNC: 7 G/DL (ref 6–8.5)
RBC # BLD AUTO: 4.58 10*6/MM3 (ref 4.14–5.8)
SODIUM SERPL-SCNC: 136 MMOL/L (ref 136–145)
TRIGL SERPL-MCNC: 420 MG/DL (ref 0–150)
VLDLC SERPL-MCNC: 72 MG/DL (ref 5–40)
WBC NRBC COR # BLD: 5.49 10*3/MM3 (ref 3.4–10.8)

## 2023-10-30 PROCEDURE — 99396 PREV VISIT EST AGE 40-64: CPT | Performed by: INTERNAL MEDICINE

## 2023-10-30 PROCEDURE — 82607 VITAMIN B-12: CPT

## 2023-10-30 PROCEDURE — 83036 HEMOGLOBIN GLYCOSYLATED A1C: CPT

## 2023-10-30 PROCEDURE — 82043 UR ALBUMIN QUANTITATIVE: CPT

## 2023-10-30 PROCEDURE — 86140 C-REACTIVE PROTEIN: CPT

## 2023-10-30 PROCEDURE — 36415 COLL VENOUS BLD VENIPUNCTURE: CPT

## 2023-10-30 PROCEDURE — 80050 GENERAL HEALTH PANEL: CPT

## 2023-10-30 PROCEDURE — 82172 ASSAY OF APOLIPOPROTEIN: CPT

## 2023-10-30 PROCEDURE — 84403 ASSAY OF TOTAL TESTOSTERONE: CPT

## 2023-10-30 PROCEDURE — 82570 ASSAY OF URINE CREATININE: CPT

## 2023-10-30 PROCEDURE — 84402 ASSAY OF FREE TESTOSTERONE: CPT

## 2023-10-30 PROCEDURE — 80061 LIPID PANEL: CPT

## 2023-10-30 PROCEDURE — 85652 RBC SED RATE AUTOMATED: CPT

## 2023-10-30 RX ORDER — EPINEPHRINE 0.3 MG/.3ML
0.3 INJECTION SUBCUTANEOUS ONCE
Qty: 1 EACH | Refills: 0 | Status: SHIPPED | OUTPATIENT
Start: 2023-10-30 | End: 2023-10-30

## 2023-10-30 NOTE — PROGRESS NOTES
[facility]     Fili Mcclellan  1980   [id number]      Patient Care Team:  Ramana Bautista MD as PCP - General  Ramana Bautista MD as PCP - Family Medicine    Chief Complaint::   Chief Complaint   Patient presents with    Annual Exam        HPI  He comes in for annual examination and for follow-up of hypertension, hyperlipidemia, prediabetes, fatty liver, and panic disorder.    Bilateral knee pain  His knees are doing will, they will flare upon the medial lateral sides. He tries to walk 3 to 4 miles in the summer almost daily with a friend. He states that occasionally, he thinks it is a combination of golfing and torque. He states that it will flare up on the medial and lateral side. He conveys that it goes away. He will ice it. 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    Foot pain  He conveys that his feet ache. It has been that way for a long time. He is staying in the OR and all the years of sports and walking. He conveys pain in his  (laterality not specified) toe, in the joint.    Neck pain  He conveys that his neck hurts. It has not been going on for a while. He is not sure if it is the way he sleeps. He has had a CPAP for 4 to 5 years.    Ear discomfort  He always feels like he has to pop his ears constantly. There is pressure that annoys him. When it is worse, it can almost make him feel a little foggy. He has tried Claritin and Nasacort. He does not have congestion; it is more of a pressure that makes him feel like he wants to pop his ears. Sometimes it will pop, and it feels pretty good and then he feels clearer headed. Today it is decent. Sometimes it feels like his head is a little swollen. He will have a couple of days when he cannot think of the words then he snaps out of it. He does not feel as mentally sharp some days. He went to an ENT years ago. It seems like it has gotten a little worse than it used to be. He conveys that his  "sister has noticed the same thing. He conveys that if he goes swimming and gets water in his ear, it is worse. He will put alcohol or put swimmer's ear stuff in it. He is not dizzy. It just feels \"clogged headed\". He denies any hearing loss.    Fatigue  His energy level is up and down. He feels like he exercises more in the winter. He walks a fair amount. He had tendinitis and golfer's elbow. He conveys that lifting weights is out of the question. He tends to get to the gym more in the wintertime than he does in the summer.    Depression  His mood is okay. He feels like he is stressed and does not think that his wife does not understand. All 3 of his kids are at [employer]. He conveys that all the pressures are on him to keep everything up. He thinks he is a stress eater. He enjoys cooking for the kids. He feels like stress takes a toll more than anything. He feels as though he may have \"brain fog\", related to COVID.      Chronic Conditions:  Hypertension, hyperlipidemia, prediabetes, fatty liver, and panic disorder.    Patient Active Problem List   Diagnosis    AR (allergic rhinitis)    Primary hypertension    Chronic kidney disease, stage 3    Mixed hyperlipidemia    Obesity    Panic disorder without agoraphobia    Prediabetes    Snoring    NAYELY (obstructive sleep apnea)    Fatty liver    Morbidly obese        Past Medical History:   Diagnosis Date    Closed traumatic nondisplaced fracture of two ribs of left side with routine healing 2/9/2021    Request for ER records and scan.  Discussed pain management with patient.  Trial of tramadol 50 mg up to every 8 hours #30.  Will call and check in on him on Friday.  He is encouraged to continue lidocaine patches, as he states this does help with pain.  Warm moist heat as tolerated. He can use Tylenol with tramadol to help with pain management.    Hypertension        Past Surgical History:   Procedure Laterality Date    HERNIA REPAIR         Family History   Problem Relation Age " of Onset    Stroke Other     Hypertension Other     Breast cancer Mother     Heart disease Father     Hypertension Father     Alcohol abuse Father        Social History     Socioeconomic History    Marital status:    Tobacco Use    Smoking status: Never    Smokeless tobacco: Never   Vaping Use    Vaping Use: Never used   Substance and Sexual Activity    Alcohol use: Yes     Alcohol/week: 12.0 standard drinks of alcohol     Types: 2 Glasses of wine, 6 Cans of beer, 4 Drinks containing 0.5 oz of alcohol per week     Comment: socially    Drug use: Never    Sexual activity: Yes     Partners: Female     Birth control/protection: None, Tubal ligation       Allergies   Allergen Reactions    Shellfish-Derived Products Anaphylaxis     Edema    Iodides Other (See Comments)     Tries to be careful about it with one kidney and sensitivity to shellfish         Current Outpatient Medications:     enalapril (VASOTEC) 10 MG tablet, TAKE 1 TABLET BY MOUTH DAILY., Disp: 30 tablet, Rfl: 5    escitalopram (LEXAPRO) 10 MG tablet, TAKE ONE TABLET BY MOUTH EVERY DAY, Disp: 30 tablet, Rfl: 2    fenofibrate (TRICOR) 54 MG tablet, TAKE 1 TABLET BY MOUTH EVERY DAY, Disp: 30 tablet, Rfl: 5    losartan (COZAAR) 100 MG tablet, Take 1 tablet by mouth Daily., Disp: 30 tablet, Rfl: 3    Immunization History   Administered Date(s) Administered    COVID-19 (MODERNA) 1st,2nd,3rd Dose Monovalent 03/16/2021, 04/19/2021    Flu Vaccine Quad PF >36MO 10/18/2017, 09/01/2019    Fluzone (or Fluarix & Flulaval for VFC) >6mos 10/24/2021, 10/12/2022    Fluzone Quad >6mos (Multi-dose) 01/05/2016, 09/18/2020    Hepatitis A 01/31/2019, 09/22/2020    Tdap 02/15/2016        Health Maintenance Due   Topic Date Due    Pneumococcal Vaccine 0-64 (1 - PCV) Never done    ANNUAL PHYSICAL  10/13/2022    INFLUENZA VACCINE  08/01/2023    COVID-19 Vaccine (3 - 2023-24 season) 09/01/2023        Review of Systems     Review of systems is otherwise unremarkable.    Vital  "Signs  Vitals:    10/30/23 1451   BP: 124/88   BP Location: Left arm   Patient Position: Sitting   Cuff Size: Large Adult   Pulse: 76   Temp: 98.2 °F (36.8 °C)   Weight: 118 kg (260 lb 9.6 oz)   Height: 174.5 cm (68.7\")   PainSc: 0-No pain       Physical Exam  Vitals and nursing note reviewed.   Constitutional:       Appearance: He is well-developed. He is obese.   HENT:      Head: Normocephalic and atraumatic.      Right Ear: External ear normal.      Left Ear: External ear normal.      Nose: Nose normal.      Mouth/Throat:      Pharynx: No oropharyngeal exudate.   Eyes:      Conjunctiva/sclera: Conjunctivae normal.      Pupils: Pupils are equal, round, and reactive to light.   Neck:      Thyroid: No thyromegaly.      Vascular: No JVD.   Cardiovascular:      Rate and Rhythm: Normal rate and regular rhythm.      Heart sounds: Normal heart sounds. No murmur heard.     No friction rub. No gallop.   Pulmonary:      Effort: Pulmonary effort is normal. No respiratory distress.      Breath sounds: Normal breath sounds. No wheezing or rales.   Chest:      Chest wall: No tenderness.   Abdominal:      General: Bowel sounds are normal. There is no distension.      Palpations: Abdomen is soft. There is no mass.      Tenderness: There is no abdominal tenderness. There is no guarding or rebound.      Hernia: No hernia is present.   Musculoskeletal:         General: No tenderness. Normal range of motion.      Cervical back: Normal range of motion and neck supple.   Lymphadenopathy:      Cervical: No cervical adenopathy.   Skin:     General: Skin is warm and dry.      Findings: No erythema or rash.   Neurological:      Mental Status: He is alert and oriented to person, place, and time.      Cranial Nerves: No cranial nerve deficit.      Sensory: No sensory deficit.      Motor: No abnormal muscle tone.      Coordination: Coordination normal.      Deep Tendon Reflexes: Reflexes normal.   Psychiatric:         Behavior: Behavior " normal.         Thought Content: Thought content normal.         Judgment: Judgment normal.          Physical examination, he is obese. Examination is otherwise normal.    Procedures     Fall Risk Screen:  ASPEN Fall Risk Assessment has not been completed.    Health Habits and Functional and Cognitive Screening:       No data to display                Depression [place]  PHQ-9 Total Score: 0     ACE III MINI             Assessment/Plan:    Diagnoses and all orders for this visit:    1. Preventative health care (Primary)    2. Primary hypertension    3. Mixed hyperlipidemia    4. Prediabetes    5. Panic disorder without agoraphobia    6. Fatty liver    7. Other fatigue  -     Vitamin B12; Future  -     TSH; Future  -     Testosterone, Free, Total; Future    8. Myalgia  -     Sedimentation Rate; Future  -     C-reactive Protein; Future    9. Discomfort of both ears  -     Ambulatory Referral to ENT (Otolaryngology)    10. Solitary kidney, acquired    Other orders  -     EPINEPHrine (EpiPen 2-Mauri) 0.3 MG/0.3ML solution auto-injector injection; Inject 0.3 mL under the skin into the appropriate area as directed 1 (One) Time for 1 dose.  Dispense: 1 each; Refill: 0            Labs  Results for orders placed or performed in visit on 10/30/23   Comprehensive Metabolic Panel    Specimen: Blood   Result Value Ref Range    Glucose 94 65 - 99 mg/dL    BUN 10 6 - 20 mg/dL    Creatinine 0.94 0.76 - 1.27 mg/dL    Sodium 136 136 - 145 mmol/L    Potassium 4.4 3.5 - 5.2 mmol/L    Chloride 99 98 - 107 mmol/L    CO2 28.3 22.0 - 29.0 mmol/L    Calcium 9.7 8.6 - 10.5 mg/dL    Total Protein 7.0 6.0 - 8.5 g/dL    Albumin 4.8 3.5 - 5.2 g/dL    ALT (SGPT) 35 1 - 41 U/L    AST (SGOT) 28 1 - 40 U/L    Alkaline Phosphatase 59 39 - 117 U/L    Total Bilirubin 0.6 0.0 - 1.2 mg/dL    Globulin 2.2 gm/dL    A/G Ratio 2.2 g/dL    BUN/Creatinine Ratio 10.6 7.0 - 25.0    Anion Gap 8.7 5.0 - 15.0 mmol/L    eGFR 102.5 >60.0 mL/min/1.73   Hemoglobin A1c     Specimen: Blood   Result Value Ref Range    Hemoglobin A1C 5.90 (H) 4.80 - 5.60 %   Lipid Panel    Specimen: Blood   Result Value Ref Range    Total Cholesterol 214 (H) 0 - 200 mg/dL    Triglycerides 420 (H) 0 - 150 mg/dL    HDL Cholesterol 36 (L) 40 - 60 mg/dL    LDL Cholesterol  106 (H) 0 - 100 mg/dL    VLDL Cholesterol 72 (H) 5 - 40 mg/dL    LDL/HDL Ratio 2.61    Microalbumin / Creatinine Urine Ratio - Urine, Clean Catch    Specimen: Urine, Clean Catch   Result Value Ref Range    Microalbumin/Creatinine Ratio 59.8 (H) 0.0 - 29.0 mg/g    Creatinine, Urine 93.7 mg/dL    Microalbumin, Urine 5.6 mg/dL   CBC Auto Differential    Specimen: Blood   Result Value Ref Range    WBC 5.49 3.40 - 10.80 10*3/mm3    RBC 4.58 4.14 - 5.80 10*6/mm3    Hemoglobin 13.5 13.0 - 17.7 g/dL    Hematocrit 40.0 37.5 - 51.0 %    MCV 87.3 79.0 - 97.0 fL    MCH 29.5 26.6 - 33.0 pg    MCHC 33.8 31.5 - 35.7 g/dL    RDW 13.1 12.3 - 15.4 %    RDW-SD 41.3 37.0 - 54.0 fl    MPV 11.4 6.0 - 12.0 fL    Platelets 219 140 - 450 10*3/mm3    Neutrophil % 50.9 42.7 - 76.0 %    Lymphocyte % 36.6 19.6 - 45.3 %    Monocyte % 8.7 5.0 - 12.0 %    Eosinophil % 2.2 0.3 - 6.2 %    Basophil % 0.7 0.0 - 1.5 %    Immature Grans % 0.9 (H) 0.0 - 0.5 %    Neutrophils, Absolute 2.79 1.70 - 7.00 10*3/mm3    Lymphocytes, Absolute 2.01 0.70 - 3.10 10*3/mm3    Monocytes, Absolute 0.48 0.10 - 0.90 10*3/mm3    Eosinophils, Absolute 0.12 0.00 - 0.40 10*3/mm3    Basophils, Absolute 0.04 0.00 - 0.20 10*3/mm3    Immature Grans, Absolute 0.05 0.00 - 0.05 10*3/mm3    nRBC 0.0 0.0 - 0.2 /100 WBC      1. Prevention  - He continues to struggle with weight. This is discussed below.    2. Hypertension  - Blood pressure is well controlled on losartan and enalapril.   - Nephrology has treated him aggressively with this unusual regimen because of his presence of a solitary kidney. He is convinced that he has had myalgias since he has been on this regimen.   - Empirically, I asked him to  "hold enalapril.   - Continue losartan and monitor blood pressure carefully.    3. Hyperlipidemia  - Triglycerides remains significantly elevated despite fenofibrate.   - ApoB is pending. Last ApoB was 129. If it is not significantly better, would add a statin and perhaps discontinue fenofibrate.    4. Prediabetes  - Hemoglobin A1c is slightly up as well.   - The treatment remains carb restriction and avoidance of weight gain.    5. Panic disorder  - Well controlled on escitalopram.    6. \"Brain fog\"  - Possibly a post COVID-19 symptom.   - He denies mood disorder.   - We will obtain B12 and thyroid function.    7. Myalgia  - Further laboratory studies are ordered.    8. Ear discomfort  - No evidence of acute sinusitis.   - He will see ENT. If that is not helpful, then would consider brain imaging.    Follow up in 1 year.      Counseling was given to patient for the following topics: appropriate exercise 150 minutes per week, disease prevention, and healthy eating habits.    Plan of care reviewed with patient at the conclusion of today's visit. Education was provided regarding diagnosis, management, and any prescribed or recommended OTC medications.Patient verbalizes understanding of and agreement with management plan.         Ramana Bautista MD    Transcribed from ambient dictation for Ramana Bautista MD by Hannah Ritchie.  10/30/23   16:51 EDT    Patient or patient representative verbalized consent to the visit recording.  I have personally performed the services described in this document as transcribed by the above individual, and it is both accurate and complete.                 "

## 2023-10-31 LAB
CRP SERPL-MCNC: <0.3 MG/DL (ref 0–0.5)
TSH SERPL DL<=0.05 MIU/L-ACNC: 3.37 UIU/ML (ref 0.27–4.2)
VIT B12 BLD-MCNC: 364 PG/ML (ref 211–946)

## 2023-11-01 LAB — APO B SERPL-MCNC: 130 MG/DL

## 2023-11-05 DIAGNOSIS — R79.89 LOW TESTOSTERONE: Primary | ICD-10-CM

## 2023-11-05 DIAGNOSIS — E78.2 MIXED HYPERLIPIDEMIA: Primary | ICD-10-CM

## 2023-11-05 LAB
TESTOST FREE SERPL-MCNC: 7.6 PG/ML (ref 6.8–21.5)
TESTOST SERPL-MCNC: 129 NG/DL (ref 264–916)

## 2023-11-05 RX ORDER — ROSUVASTATIN CALCIUM 10 MG/1
10 TABLET, COATED ORAL DAILY
Qty: 90 TABLET | Refills: 3 | Status: SHIPPED | OUTPATIENT
Start: 2023-11-05

## 2023-11-06 ENCOUNTER — LAB (OUTPATIENT)
Dept: LAB | Facility: HOSPITAL | Age: 43
End: 2023-11-06
Payer: COMMERCIAL

## 2023-11-06 DIAGNOSIS — R79.89 LOW TESTOSTERONE: ICD-10-CM

## 2023-11-06 LAB — TESTOST SERPL-MCNC: 224 NG/DL (ref 249–836)

## 2023-11-06 PROCEDURE — 84403 ASSAY OF TOTAL TESTOSTERONE: CPT

## 2023-11-08 DIAGNOSIS — E29.1 PRIMARY MALE HYPOGONADISM: Primary | ICD-10-CM

## 2023-11-08 RX ORDER — TESTOSTERONE 20.25 MG/1.25G
GEL TOPICAL
Qty: 75 G | Refills: 3 | Status: SHIPPED | OUTPATIENT
Start: 2023-11-08

## 2023-11-10 ENCOUNTER — TELEPHONE (OUTPATIENT)
Dept: INTERNAL MEDICINE | Facility: CLINIC | Age: 43
End: 2023-11-10
Payer: COMMERCIAL

## 2023-11-10 ENCOUNTER — PRIOR AUTHORIZATION (OUTPATIENT)
Dept: INTERNAL MEDICINE | Facility: CLINIC | Age: 43
End: 2023-11-10
Payer: COMMERCIAL

## 2023-11-16 ENCOUNTER — OFFICE VISIT (OUTPATIENT)
Dept: INTERNAL MEDICINE | Facility: CLINIC | Age: 43
End: 2023-11-16
Payer: COMMERCIAL

## 2023-11-16 VITALS
WEIGHT: 260.8 LBS | DIASTOLIC BLOOD PRESSURE: 88 MMHG | SYSTOLIC BLOOD PRESSURE: 134 MMHG | TEMPERATURE: 98.4 F | HEIGHT: 69 IN | BODY MASS INDEX: 38.63 KG/M2 | HEART RATE: 76 BPM

## 2023-11-16 DIAGNOSIS — J20.8 VIRAL BRONCHITIS: Primary | ICD-10-CM

## 2023-11-16 PROCEDURE — 99213 OFFICE O/P EST LOW 20 MIN: CPT | Performed by: INTERNAL MEDICINE

## 2023-11-16 RX ORDER — METHYLPREDNISOLONE 4 MG/1
TABLET ORAL
Qty: 21 TABLET | Refills: 0 | Status: SHIPPED | OUTPATIENT
Start: 2023-11-16

## 2023-11-16 NOTE — PROGRESS NOTES
[facility]     Fili Mcclellan  1980   [id number]      Patient Care Team:  Ramana Bautista MD as PCP - General  Ramana aButista MD as PCP - Family Medicine    Chief Complaint::   Chief Complaint   Patient presents with    Cough     Home covid test this am negative        HPI  The patient comes in complaining of a cough.    Cough  He has had a cough for approximately 1 week. He reports mild itching in his throat. He is noticing expiratory wheeze. He tested negative for COVID-19 on the home COVID-19 test kit. He admits mild nasal congestion and cough productive of some yellow phlegm. He denies any postnasal drainage or allergy symptoms. He notes that his symptoms are not severe. He has been using Mucinex Cough and Chest Congestion. He denies pharyngitis.    Chronic Conditions:      Patient Active Problem List   Diagnosis    AR (allergic rhinitis)    Primary hypertension    Chronic kidney disease, stage 3    Mixed hyperlipidemia    Obesity    Panic disorder without agoraphobia    Prediabetes    Snoring    NAYELY (obstructive sleep apnea)    Fatty liver    Morbidly obese        Past Medical History:   Diagnosis Date    Closed traumatic nondisplaced fracture of two ribs of left side with routine healing 2/9/2021    Request for ER records and scan.  Discussed pain management with patient.  Trial of tramadol 50 mg up to every 8 hours #30.  Will call and check in on him on Friday.  He is encouraged to continue lidocaine patches, as he states this does help with pain.  Warm moist heat as tolerated. He can use Tylenol with tramadol to help with pain management.    Hypertension        Past Surgical History:   Procedure Laterality Date    HERNIA REPAIR         Family History   Problem Relation Age of Onset    Stroke Other     Hypertension Other     Breast cancer Mother     Heart disease Father     Hypertension Father     Alcohol abuse Father        Social History     Socioeconomic History     "Marital status:    Tobacco Use    Smoking status: Never    Smokeless tobacco: Never   Vaping Use    Vaping Use: Never used   Substance and Sexual Activity    Alcohol use: Yes     Alcohol/week: 12.0 standard drinks of alcohol     Types: 2 Glasses of wine, 6 Cans of beer, 4 Drinks containing 0.5 oz of alcohol per week     Comment: socially    Drug use: Never    Sexual activity: Yes     Partners: Female     Birth control/protection: None, Tubal ligation       Allergies   Allergen Reactions    Shellfish-Derived Products Anaphylaxis     Edema    Iodides Other (See Comments)     Tries to be careful about it with one kidney and sensitivity to shellfish         Current Outpatient Medications:     enalapril (VASOTEC) 10 MG tablet, TAKE 1 TABLET BY MOUTH DAILY., Disp: 30 tablet, Rfl: 5    escitalopram (LEXAPRO) 10 MG tablet, TAKE ONE TABLET BY MOUTH EVERY DAY, Disp: 30 tablet, Rfl: 2    losartan (COZAAR) 100 MG tablet, Take 1 tablet by mouth Daily., Disp: 30 tablet, Rfl: 3    rosuvastatin (Crestor) 10 MG tablet, Take 1 tablet by mouth Daily., Disp: 90 tablet, Rfl: 3    Testosterone 1.62 % gel, Apply 40.5 mg transdermaly every morning., Disp: 75 g, Rfl: 3    methylPREDNISolone (MEDROL) 4 MG dose pack, Take as directed on package instructions., Disp: 21 tablet, Rfl: 0    Review of Systems   Constitutional: Negative.    Respiratory: Negative.  Negative for chest tightness and shortness of breath.    Cardiovascular: Negative.  Negative for chest pain.   Gastrointestinal:  Negative for abdominal pain, blood in stool, constipation and diarrhea.        Vital Signs  Vitals:    11/16/23 1139   BP: 134/88   BP Location: Left arm   Patient Position: Sitting   Cuff Size: Adult   Pulse: 76   Temp: 98.4 °F (36.9 °C)   Weight: 118 kg (260 lb 12.8 oz)   Height: 174.5 cm (68.7\")   PainSc: 0-No pain       Physical Exam  Vitals reviewed.   Constitutional:       Appearance: He is well-developed.   HENT:      Head: Normocephalic and " atraumatic.      Mouth/Throat:      Pharynx: Posterior oropharyngeal erythema present.      Comments: He has mild posterior pharyngeal erythema without exudate.   Cardiovascular:      Rate and Rhythm: Normal rate and regular rhythm.      Heart sounds: Normal heart sounds. No murmur heard.  Pulmonary:      Effort: Pulmonary effort is normal.      Breath sounds: Wheezing present.      Comments: There is diffuse expiratory wheezing throughout both lung fields.  Neurological:      Mental Status: He is alert and oriented to person, place, and time.          Procedures    ACE III MINI             Assessment/Plan:    Diagnoses and all orders for this visit:    1. Viral bronchitis (Primary)    Other orders  -     methylPREDNISolone (MEDROL) 4 MG dose pack; Take as directed on package instructions.  Dispense: 21 tablet; Refill: 0        Viral bronchitis  - He is given a Medrol Dosepak to minimize symptoms. This should be self-limited, and the treatment is aimed at his symptoms.    Follow up as previously scheduled.      Plan of care reviewed with patient at the conclusion of today's visit. Education was provided regarding diagnosis, management, and any prescribed or recommended OTC medications.Patient verbalizes understanding of and agreement with management plan.         Ramana Bautista MD     Transcribed from ambient dictation for Ramana Bautista MD by Mikey Starr.  11/16/23   16:02 EST    Patient or patient representative verbalized consent to the visit recording.  I have personally performed the services described in this document as transcribed by the above individual, and it is both accurate and complete.

## 2023-11-20 RX ORDER — AZITHROMYCIN 250 MG/1
TABLET, FILM COATED ORAL
Qty: 6 TABLET | Refills: 0 | Status: SHIPPED | OUTPATIENT
Start: 2023-11-20

## 2023-12-05 RX ORDER — ESCITALOPRAM OXALATE 10 MG/1
TABLET ORAL
Qty: 30 TABLET | Refills: 2 | Status: SHIPPED | OUTPATIENT
Start: 2023-12-05

## 2024-01-17 ENCOUNTER — TELEMEDICINE (OUTPATIENT)
Dept: INTERNAL MEDICINE | Facility: CLINIC | Age: 44
End: 2024-01-17
Payer: COMMERCIAL

## 2024-01-17 DIAGNOSIS — R79.89 LOW TESTOSTERONE: Primary | ICD-10-CM

## 2024-01-17 DIAGNOSIS — U07.1 UPPER RESPIRATORY TRACT INFECTION DUE TO COVID-19 VIRUS: ICD-10-CM

## 2024-01-17 DIAGNOSIS — J06.9 UPPER RESPIRATORY TRACT INFECTION DUE TO COVID-19 VIRUS: ICD-10-CM

## 2024-01-17 PROCEDURE — 99213 OFFICE O/P EST LOW 20 MIN: CPT | Performed by: INTERNAL MEDICINE

## 2024-01-17 NOTE — PROGRESS NOTES
[facility]     Fili Mcclellan  1980   [id number]      Patient Care Team:  Ramana Bautista MD as PCP - General  Ramana Bautista MD as PCP - Family Medicine    Chief Complaint::   Chief Complaint   Patient presents with    Covid-19 Home Monitoring Video Visit    You have chosen to receive care through a telehealth visit.  Do you consent to use a video/audio connection for your medical care today? Yes    Video visit performed from my office in [place], [place], pt at his home in [place].       HPI  The patient is seen via video visit for COVID-19. He developed sore throat, congestion, loss of taste and smell 1 to 2 days ago. He tested positive today for COVID-19. There is no fever, shortness of breath, or GI symptoms.     He notes that since initiating treatment with testosterone, he does have improvement in energy level.    Chronic Conditions:      Patient Active Problem List   Diagnosis    AR (allergic rhinitis)    Primary hypertension    Chronic kidney disease, stage 3    Mixed hyperlipidemia    Obesity    Panic disorder without agoraphobia    Prediabetes    Snoring    NAYELY (obstructive sleep apnea)    Fatty liver    Morbidly obese        Past Medical History:   Diagnosis Date    Closed traumatic nondisplaced fracture of two ribs of left side with routine healing 2/9/2021    Request for ER records and scan.  Discussed pain management with patient.  Trial of tramadol 50 mg up to every 8 hours #30.  Will call and check in on him on Friday.  He is encouraged to continue lidocaine patches, as he states this does help with pain.  Warm moist heat as tolerated. He can use Tylenol with tramadol to help with pain management.    Hypertension        Past Surgical History:   Procedure Laterality Date    HERNIA REPAIR         Family History   Problem Relation Age of Onset    Stroke Other     Hypertension Other     Breast cancer Mother     Heart disease Father     Hypertension Father      Alcohol abuse Father        Social History     Socioeconomic History    Marital status:    Tobacco Use    Smoking status: Never    Smokeless tobacco: Never   Vaping Use    Vaping Use: Never used   Substance and Sexual Activity    Alcohol use: Yes     Alcohol/week: 12.0 standard drinks of alcohol     Types: 2 Glasses of wine, 6 Cans of beer, 4 Drinks containing 0.5 oz of alcohol per week     Comment: socially    Drug use: Never    Sexual activity: Yes     Partners: Female     Birth control/protection: None, Tubal ligation       Allergies   Allergen Reactions    Shellfish-Derived Products Anaphylaxis     Edema    Iodides Other (See Comments)     Tries to be careful about it with one kidney and sensitivity to shellfish         Current Outpatient Medications:     enalapril (VASOTEC) 10 MG tablet, TAKE 1 TABLET BY MOUTH DAILY., Disp: 30 tablet, Rfl: 5    escitalopram (LEXAPRO) 10 MG tablet, TAKE ONE TABLET BY MOUTH EVERY DAY, Disp: 30 tablet, Rfl: 2    losartan (COZAAR) 100 MG tablet, Take 1 tablet by mouth Daily., Disp: 30 tablet, Rfl: 3    Nirmatrelvir & Ritonavir, 300mg/100mg, (PAXLOVID) 20 x 150 MG & 10 x 100MG tablet therapy pack tablet, Take 3 tablets by mouth 2 (Two) Times a Day for 5 days., Disp: 30 each, Rfl: 0    rosuvastatin (Crestor) 10 MG tablet, Take 1 tablet by mouth Daily., Disp: 90 tablet, Rfl: 3    Testosterone 1.62 % gel, Apply 40.5 mg transdermaly every morning., Disp: 75 g, Rfl: 3    Review of Systems   Constitutional:  Negative for chills, fatigue and fever.   HENT:  Negative for congestion, ear pain and sinus pressure.    Respiratory:  Negative for cough, chest tightness, shortness of breath and wheezing.    Cardiovascular:  Negative for chest pain and palpitations.   Gastrointestinal:  Negative for abdominal pain, blood in stool and constipation.   Skin:  Negative for color change.   Allergic/Immunologic: Negative for environmental allergies.   Neurological:  Negative for dizziness, speech  difficulty and headache.   Psychiatric/Behavioral:  Negative for decreased concentration. The patient is not nervous/anxious.         Vital Signs  There were no vitals filed for this visit.    Physical Exam  Constitutional:       General: He is not in acute distress.     Appearance: Normal appearance. He is not ill-appearing.   HENT:      Head: Normocephalic and atraumatic.   Pulmonary:      Effort: Pulmonary effort is normal. No respiratory distress.   Neurological:      Mental Status: He is alert and oriented to person, place, and time.      Cranial Nerves: No cranial nerve deficit.          Procedures    ACE III MINI             Assessment/Plan:    Diagnoses and all orders for this visit:    1. Low testosterone (Primary)  -     Testosterone; Future    2. Upper respiratory tract infection due to COVID-19 virus    Other orders  -     Nirmatrelvir & Ritonavir, 300mg/100mg, (PAXLOVID) 20 x 150 MG & 10 x 100MG tablet therapy pack tablet; Take 3 tablets by mouth 2 (Two) Times a Day for 5 days.  Dispense: 30 each; Refill: 0        1. Upper respiratory infection secondary to COVID-19  - He does have a solitary kidney and although his GFR has normalized, I think for that reason, it is reasonable to prescribe Paxlovid. He will follow up as scheduled and call if he has further problems.    Plan of care reviewed with patient at the conclusion of today's visit. Education was provided regarding diagnosis, management, and any prescribed or recommended OTC medications.Patient verbalizes understanding of and agreement with management plan.         Ramana Bautista MD       Transcribed from ambient dictation for Ramana Bautista MD by Sergio Chung.  01/17/24   12:01 EST    Patient or patient representative verbalized consent to the visit recording.  I have personally performed the services described in this document as transcribed by the above individual, and it is both accurate and complete.

## 2024-02-05 ENCOUNTER — TELEMEDICINE (OUTPATIENT)
Dept: INTERNAL MEDICINE | Facility: CLINIC | Age: 44
End: 2024-02-05
Payer: COMMERCIAL

## 2024-02-05 DIAGNOSIS — M54.2 NECK PAIN: ICD-10-CM

## 2024-02-05 DIAGNOSIS — F32.0 CURRENT MILD EPISODE OF MAJOR DEPRESSIVE DISORDER WITHOUT PRIOR EPISODE: Primary | ICD-10-CM

## 2024-02-05 PROCEDURE — 99213 OFFICE O/P EST LOW 20 MIN: CPT | Performed by: INTERNAL MEDICINE

## 2024-02-05 RX ORDER — ESCITALOPRAM OXALATE 10 MG/1
TABLET ORAL
Qty: 30 TABLET | Refills: 2 | Status: SHIPPED | OUTPATIENT
Start: 2024-02-05

## 2024-02-05 NOTE — PROGRESS NOTES
[facility]     Fili Mcclellan  1980   [id number]      Patient Care Team:  Ramana Bautista MD as PCP - General  Ramana Bautista MD as PCP - Family Medicine    Chief Complaint::   Chief Complaint   Patient presents with    Depression      You have chosen to receive care through a telehealth visit.  Do you consent to use a video/audio connection for your medical care today? Yes    Video visit was performed from my office in [facility], the patient was at his office in [facility].      HPI  The patient presents today via video visit for evaluation of neck pain and post COVID-19 symptoms.     COVID-19:  He reports improvement from the COVID-19 infection. He complains of impaired cognitive function and reduced alertness. He attributes this to work-related stress and possible post-COVID-19 syndrome. He has been having intermittent episodes of diminished quick recall for 3 to 4 months, which last for a couple of days and have worsened post COVID-19. He denies any other neurological symptoms.     Depression:  He has been on Lexapro for a long duration for depression. He states that he does not feel like himself recently. He has been experiencing fatigue and tiredness. He walked 4 miles this morning. He has difficulty remembering names and people, trouble with quick word recall, and finding the right words, which is unusual for him. He has elevated levels of work stress.     Neck pain:  The patient reports neck pain which has been ongoing for several days. His neck pops loudly when he leans his head back, especially when sitting. He reports frequent tension headaches and pressure in his eyes due to high stress levels. He complains of neck stiffness in the morning and tension in the back of his head when he rotates it. He has been experiencing sleep disturbances due to pain. He reports shoulder pain as well. He tries to relax by massaging his neck and shoulder muscles  with a massager while laying on his bed. He has had chiropractic treatment in the past. He requests imaging to rule out serious neck problems.    Health maintenance:  The patient is currently taking Centrum multivitamins and supplements. He has questions regarding his vitamin D level.    Chronic Conditions:     Patient Active Problem List   Diagnosis    AR (allergic rhinitis)    Primary hypertension    Chronic kidney disease, stage 3    Mixed hyperlipidemia    Obesity    Panic disorder without agoraphobia    Prediabetes    Snoring    NAYELY (obstructive sleep apnea)    Fatty liver    Morbidly obese        Past Medical History:   Diagnosis Date    Closed traumatic nondisplaced fracture of two ribs of left side with routine healing 2/9/2021    Request for ER records and scan.  Discussed pain management with patient.  Trial of tramadol 50 mg up to every 8 hours #30.  Will call and check in on him on Friday.  He is encouraged to continue lidocaine patches, as he states this does help with pain.  Warm moist heat as tolerated. He can use Tylenol with tramadol to help with pain management.    Hypertension        Past Surgical History:   Procedure Laterality Date    HERNIA REPAIR         Family History   Problem Relation Age of Onset    Stroke Other     Hypertension Other     Breast cancer Mother     Heart disease Father     Hypertension Father     Alcohol abuse Father        Social History     Socioeconomic History    Marital status:    Tobacco Use    Smoking status: Never    Smokeless tobacco: Never   Vaping Use    Vaping Use: Never used   Substance and Sexual Activity    Alcohol use: Yes     Alcohol/week: 12.0 standard drinks of alcohol     Types: 2 Glasses of wine, 6 Cans of beer, 4 Drinks containing 0.5 oz of alcohol per week     Comment: socially    Drug use: Never    Sexual activity: Yes     Partners: Female     Birth control/protection: None, Tubal ligation       Allergies   Allergen Reactions     Shellfish-Derived Products Anaphylaxis     Edema    Iodides Other (See Comments)     Tries to be careful about it with one kidney and sensitivity to shellfish         Current Outpatient Medications:     enalapril (VASOTEC) 10 MG tablet, TAKE 1 TABLET BY MOUTH DAILY., Disp: 30 tablet, Rfl: 5    escitalopram (LEXAPRO) 10 MG tablet, TAKE ONE TABLET BY MOUTH EVERY DAY, Disp: 30 tablet, Rfl: 2    losartan (COZAAR) 100 MG tablet, Take 1 tablet by mouth Daily., Disp: 30 tablet, Rfl: 3    rosuvastatin (Crestor) 10 MG tablet, Take 1 tablet by mouth Daily., Disp: 90 tablet, Rfl: 3    Testosterone 1.62 % gel, Apply 40.5 mg transdermaly every morning., Disp: 75 g, Rfl: 3    Review of Systems   Constitutional:  Positive for fatigue. Negative for chills and fever.   HENT:  Negative for congestion, ear pain and sinus pressure.         Pressure in eyes.    Respiratory:  Negative for cough, chest tightness, shortness of breath and wheezing.    Cardiovascular:  Negative for chest pain and palpitations.   Gastrointestinal:  Negative for abdominal pain, blood in stool and constipation.   Musculoskeletal:  Positive for neck pain and neck stiffness.        Positive shoulder pain   Skin:  Negative for color change.   Allergic/Immunologic: Negative for environmental allergies.   Neurological:  Negative for dizziness, speech difficulty and headache.        Positive for impaired cognitive function, reduced alertness, memory loss, and tension headaches.    Psychiatric/Behavioral:  Positive for sleep disturbance and depressed mood. Negative for decreased concentration. The patient is not nervous/anxious.         Vital Signs  There were no vitals filed for this visit.    Physical Exam  Constitutional:       Appearance: Normal appearance.   HENT:      Head: Normocephalic and atraumatic.   Eyes:      Extraocular Movements: Extraocular movements intact.      Pupils: Pupils are equal, round, and reactive to light.   Pulmonary:      Effort:  "Pulmonary effort is normal. No respiratory distress.   Neurological:      Mental Status: He is alert and oriented to person, place, and time.      Cranial Nerves: No cranial nerve deficit.   Psychiatric:         Mood and Affect: Mood normal.         Behavior: Behavior normal.          Procedures    ACE III MINI             Assessment/Plan:    Diagnoses and all orders for this visit:    1. Current mild episode of major depressive disorder without prior episode (Primary)    2. Neck pain  -     XR Spine Cervical 2 or 3 View; Future      1. Mild depressive disorder with lack of motivation, fatigue, and \"brain fog.\"  - That particular symptom appears to predate his recent bout with COVID-19, so even though that might magnify it, it is not the initial cause. I think most likely it is due to stress and some component of seasonal affective disorder. Methyl folate and methylcobalamin supplements were discussed. He will increase his escitalopram to 15 mg a day, and I recommended that he try to get more light exposure. In 6 weeks, if he is not considerably improved, he should get back in touch with me.     2. Neck pain.  - Per his request, we will obtain x-rays of the cervical spine, but I think this is mostly due to muscle inflammation from stress.     Follow-up as previously scheduled.     Plan of care reviewed with patient at the conclusion of today's visit. Education was provided regarding diagnosis, management, and any prescribed or recommended OTC medications.Patient verbalizes understanding of and agreement with management plan.         Ramana Bautista MD       Transcribed from ambient dictation for Ramana Bautista MD by Ron Jimenez.   02/06/24   09:23 EST    Patient or patient representative verbalized consent to the visit recording.  I have personally performed the services described in this document as transcribed by the above individual, and it is both accurate and complete.   "

## 2024-02-06 ENCOUNTER — HOSPITAL ENCOUNTER (OUTPATIENT)
Dept: GENERAL RADIOLOGY | Facility: HOSPITAL | Age: 44
Discharge: HOME | End: 2024-02-06
Admitting: INTERNAL MEDICINE
Payer: COMMERCIAL

## 2024-02-06 DIAGNOSIS — M54.2 NECK PAIN: ICD-10-CM

## 2024-02-06 PROCEDURE — 72040 X-RAY EXAM NECK SPINE 2-3 VW: CPT

## 2024-02-16 ENCOUNTER — TELEPHONE (OUTPATIENT)
Dept: INTERNAL MEDICINE | Facility: CLINIC | Age: 44
End: 2024-02-16

## 2024-02-16 NOTE — TELEPHONE ENCOUNTER
Caller: JOHN[facility]    Relationship: Other    Best call back number: [phone]     What is the best time to reach you: 1-430 CST    Who are you requesting to speak with (clinical staff, provider,  specific staff member): CLINICAL    Do you know the name of the person who called: JOHN    What was the call regarding: WOULD LIKE TO DISCUSS GAPS IN CARE.    Is it okay if the provider responds through MyChart: NO

## 2024-02-23 RX ORDER — ESCITALOPRAM OXALATE 10 MG/1
15 TABLET ORAL DAILY
Qty: 45 TABLET | Refills: 5 | Status: SHIPPED | OUTPATIENT
Start: 2024-02-23

## 2024-04-20 DIAGNOSIS — E29.1 PRIMARY MALE HYPOGONADISM: ICD-10-CM

## 2024-04-22 RX ORDER — TESTOSTERONE 20.25 MG/1.25G
GEL TOPICAL
Qty: 75 G | Refills: 3 | Status: SHIPPED | OUTPATIENT
Start: 2024-04-22

## 2024-04-22 NOTE — TELEPHONE ENCOUNTER
Rx Refill Note  Requested Prescriptions     Pending Prescriptions Disp Refills    Testosterone 1.62 % gel [Pharmacy Med Name: TESTOSTERONE 1.62 % GEL 1.62 Gel] 75 g 3     Sig: APPLY TRANSDERMALY EVERY MORNING.      Last office visit with prescribing clinician: 11/16/2023   Last telemedicine visit with prescribing clinician: 2/5/2024   Next office visit with prescribing clinician: 11/1/2024     LA: 11/08/23 #75 3R                          Would you like a call back once the refill request has been completed: [] Yes [] No    If the office needs to give you a call back, can they leave a voicemail: [] Yes [] No    Sabine Crain LPN  04/22/24, 09:34 EDT

## 2024-07-16 RX ORDER — ESCITALOPRAM OXALATE 10 MG/1
15 TABLET ORAL DAILY
Qty: 45 TABLET | Refills: 5 | Status: SHIPPED | OUTPATIENT
Start: 2024-07-16

## 2024-10-18 RX ORDER — ROSUVASTATIN CALCIUM 10 MG/1
10 TABLET, COATED ORAL DAILY
Qty: 90 TABLET | Refills: 0 | Status: SHIPPED | OUTPATIENT
Start: 2024-10-18

## 2024-12-05 ENCOUNTER — TELEPHONE (OUTPATIENT)
Dept: INTERNAL MEDICINE | Facility: CLINIC | Age: 44
End: 2024-12-05
Payer: COMMERCIAL

## 2024-12-05 DIAGNOSIS — E78.2 MIXED HYPERLIPIDEMIA: ICD-10-CM

## 2024-12-05 DIAGNOSIS — R79.89 LOW TESTOSTERONE: ICD-10-CM

## 2024-12-05 DIAGNOSIS — N18.31 STAGE 3A CHRONIC KIDNEY DISEASE: ICD-10-CM

## 2024-12-05 DIAGNOSIS — I10 PRIMARY HYPERTENSION: Primary | ICD-10-CM

## 2024-12-05 DIAGNOSIS — R73.03 PREDIABETES: ICD-10-CM

## 2024-12-05 NOTE — TELEPHONE ENCOUNTER
Caller: Fili Mcclellan    Relationship: Self    Best call back number: [phone]     What orders are you requesting (i.e. lab or imaging): BLOOD WORK     In what timeframe would the patient need to come in: AS SOON AS POSSIBLE     Where will you receive your lab/imaging services: [facility]     Additional notes: PATIENT HAS AN APPOINTMENT TOMORROW 12/6/24 AND WOULD LIKE TO GET BLOOD WORK DONE PRIOR TO THE VISIT.

## 2024-12-06 ENCOUNTER — LAB (OUTPATIENT)
Dept: LAB | Facility: HOSPITAL | Age: 44
End: 2024-12-06
Payer: COMMERCIAL

## 2024-12-06 ENCOUNTER — OFFICE VISIT (OUTPATIENT)
Dept: INTERNAL MEDICINE | Facility: CLINIC | Age: 44
End: 2024-12-06
Payer: COMMERCIAL

## 2024-12-06 VITALS
TEMPERATURE: 98.8 F | HEIGHT: 69 IN | SYSTOLIC BLOOD PRESSURE: 132 MMHG | BODY MASS INDEX: 40.73 KG/M2 | DIASTOLIC BLOOD PRESSURE: 80 MMHG | WEIGHT: 275 LBS | HEART RATE: 78 BPM

## 2024-12-06 DIAGNOSIS — R79.89 LOW TESTOSTERONE: ICD-10-CM

## 2024-12-06 DIAGNOSIS — F41.0 PANIC DISORDER WITHOUT AGORAPHOBIA: ICD-10-CM

## 2024-12-06 DIAGNOSIS — R73.03 PREDIABETES: ICD-10-CM

## 2024-12-06 DIAGNOSIS — N18.31 STAGE 3A CHRONIC KIDNEY DISEASE: ICD-10-CM

## 2024-12-06 DIAGNOSIS — E78.2 MIXED HYPERLIPIDEMIA: ICD-10-CM

## 2024-12-06 DIAGNOSIS — I10 PRIMARY HYPERTENSION: Primary | ICD-10-CM

## 2024-12-06 LAB
ALBUMIN SERPL-MCNC: 4.6 G/DL (ref 3.5–5.2)
ALBUMIN UR-MCNC: 12.2 MG/DL
ALBUMIN/GLOB SERPL: 1.4 G/DL
ALP SERPL-CCNC: 68 U/L (ref 39–117)
ALT SERPL W P-5'-P-CCNC: 67 U/L (ref 1–41)
ANION GAP SERPL CALCULATED.3IONS-SCNC: 14.1 MMOL/L (ref 5–15)
AST SERPL-CCNC: 51 U/L (ref 1–40)
BASOPHILS # BLD AUTO: 0.04 10*3/MM3 (ref 0–0.2)
BASOPHILS NFR BLD AUTO: 0.7 % (ref 0–1.5)
BILIRUB SERPL-MCNC: 0.6 MG/DL (ref 0–1.2)
BUN SERPL-MCNC: 15 MG/DL (ref 6–20)
BUN/CREAT SERPL: 16.3 (ref 7–25)
CALCIUM SPEC-SCNC: 9.4 MG/DL (ref 8.6–10.5)
CHLORIDE SERPL-SCNC: 96 MMOL/L (ref 98–107)
CHOLEST SERPL-MCNC: 210 MG/DL (ref 0–200)
CO2 SERPL-SCNC: 23.9 MMOL/L (ref 22–29)
CREAT SERPL-MCNC: 0.92 MG/DL (ref 0.76–1.27)
CREAT UR-MCNC: 42.5 MG/DL
DEPRECATED RDW RBC AUTO: 41.4 FL (ref 37–54)
EGFRCR SERPLBLD CKD-EPI 2021: 104.5 ML/MIN/1.73
EOSINOPHIL # BLD AUTO: 0.11 10*3/MM3 (ref 0–0.4)
EOSINOPHIL NFR BLD AUTO: 1.9 % (ref 0.3–6.2)
ERYTHROCYTE [DISTWIDTH] IN BLOOD BY AUTOMATED COUNT: 12.8 % (ref 12.3–15.4)
GLOBULIN UR ELPH-MCNC: 3.2 GM/DL
GLUCOSE SERPL-MCNC: 103 MG/DL (ref 65–99)
HBA1C MFR BLD: 6.1 % (ref 4.8–5.6)
HCT VFR BLD AUTO: 43.1 % (ref 37.5–51)
HDLC SERPL-MCNC: 34 MG/DL (ref 40–60)
HGB BLD-MCNC: 14.6 G/DL (ref 13–17.7)
IMM GRANULOCYTES # BLD AUTO: 0.07 10*3/MM3 (ref 0–0.05)
IMM GRANULOCYTES NFR BLD AUTO: 1.2 % (ref 0–0.5)
LDLC SERPL CALC-MCNC: 81 MG/DL (ref 0–100)
LDLC/HDLC SERPL: 1.69 {RATIO}
LYMPHOCYTES # BLD AUTO: 2.17 10*3/MM3 (ref 0.7–3.1)
LYMPHOCYTES NFR BLD AUTO: 36.7 % (ref 19.6–45.3)
MCH RBC QN AUTO: 30.2 PG (ref 26.6–33)
MCHC RBC AUTO-ENTMCNC: 33.9 G/DL (ref 31.5–35.7)
MCV RBC AUTO: 89 FL (ref 79–97)
MICROALBUMIN/CREAT UR: 287.1 MG/G (ref 0–29)
MONOCYTES # BLD AUTO: 0.58 10*3/MM3 (ref 0.1–0.9)
MONOCYTES NFR BLD AUTO: 9.8 % (ref 5–12)
NEUTROPHILS NFR BLD AUTO: 2.94 10*3/MM3 (ref 1.7–7)
NEUTROPHILS NFR BLD AUTO: 49.7 % (ref 42.7–76)
NRBC BLD AUTO-RTO: 0 /100 WBC (ref 0–0.2)
PLATELET # BLD AUTO: 204 10*3/MM3 (ref 140–450)
PMV BLD AUTO: 11.5 FL (ref 6–12)
POTASSIUM SERPL-SCNC: 4.4 MMOL/L (ref 3.5–5.2)
PROT SERPL-MCNC: 7.8 G/DL (ref 6–8.5)
RBC # BLD AUTO: 4.84 10*6/MM3 (ref 4.14–5.8)
SODIUM SERPL-SCNC: 134 MMOL/L (ref 136–145)
TESTOST SERPL-MCNC: 219 NG/DL (ref 249–836)
TRIGL SERPL-MCNC: 593 MG/DL (ref 0–150)
VLDLC SERPL-MCNC: 95 MG/DL (ref 5–40)
WBC NRBC COR # BLD AUTO: 5.91 10*3/MM3 (ref 3.4–10.8)

## 2024-12-06 PROCEDURE — 36415 COLL VENOUS BLD VENIPUNCTURE: CPT

## 2024-12-06 PROCEDURE — 82043 UR ALBUMIN QUANTITATIVE: CPT

## 2024-12-06 PROCEDURE — 80061 LIPID PANEL: CPT

## 2024-12-06 PROCEDURE — 82570 ASSAY OF URINE CREATININE: CPT

## 2024-12-06 PROCEDURE — 83036 HEMOGLOBIN GLYCOSYLATED A1C: CPT

## 2024-12-06 PROCEDURE — 80053 COMPREHEN METABOLIC PANEL: CPT

## 2024-12-06 PROCEDURE — 82172 ASSAY OF APOLIPOPROTEIN: CPT

## 2024-12-06 PROCEDURE — 85025 COMPLETE CBC W/AUTO DIFF WBC: CPT

## 2024-12-06 PROCEDURE — 99214 OFFICE O/P EST MOD 30 MIN: CPT | Performed by: INTERNAL MEDICINE

## 2024-12-06 PROCEDURE — 84403 ASSAY OF TOTAL TESTOSTERONE: CPT

## 2024-12-06 NOTE — PROGRESS NOTES
[facility]     Fili Mcclellan  1980   [id number]      Patient Care Team:  Ramana Bautista MD as PCP - General  Ramana Bautista MD as PCP - Family Medicine    Chief Complaint::   Chief Complaint   Patient presents with    Neck Pain    Hypertension    Hyperlipidemia        HPI  History of Present Illness  The patient is a 43-year-old male who presents for evaluation of neck pain and for follow up of HTN, hyperlipidemia and chronic kidney disease.    He reports persistent neck pain, which has been accompanied by an increase in headaches. He attributes this to a stressful year, including starting a new job in 01/2024 and financial difficulties. He recalls a severe headache during his COVID-19 infection that nearly led him to seek hospital care. Since then, he has experienced daily headaches, which he describes as a constant nuisance. The pain varies in location, sometimes affecting both sides of his head, other times one side, and occasionally behind his eye.    He also mentions discomfort in his ribs and sternum, which he describes as an ache rather than pain. This discomfort does not seem to be exacerbated by physical activity, such as playing golf. He notes that the discomfort is more pronounced when pressure is applied to his sternum.    He has not taken Advil due to his single kidney.      Chronic Conditions:      Patient Active Problem List   Diagnosis    AR (allergic rhinitis)    Primary hypertension    Chronic kidney disease, stage 3    Mixed hyperlipidemia    Obesity    Panic disorder without agoraphobia    Prediabetes    Snoring    NAYELY (obstructive sleep apnea)    Fatty liver    Morbidly obese        Past Medical History:   Diagnosis Date    Closed traumatic nondisplaced fracture of two ribs of left side with routine healing 2/9/2021    Request for ER records and scan.  Discussed pain management with patient.  Trial of tramadol 50 mg up to every 8 hours #30.  Will call  and check in on him on Friday.  He is encouraged to continue lidocaine patches, as he states this does help with pain.  Warm moist heat as tolerated. He can use Tylenol with tramadol to help with pain management.    Hypertension        Past Surgical History:   Procedure Laterality Date    HERNIA REPAIR         Family History   Problem Relation Age of Onset    Stroke Other     Hypertension Other     Breast cancer Mother     Heart disease Father     Hypertension Father     Alcohol abuse Father        Social History     Socioeconomic History    Marital status:    Tobacco Use    Smoking status: Never    Smokeless tobacco: Never   Vaping Use    Vaping status: Never Used   Substance and Sexual Activity    Alcohol use: Yes     Alcohol/week: 12.0 standard drinks of alcohol     Types: 2 Glasses of wine, 6 Cans of beer, 4 Drinks containing 0.5 oz of alcohol per week     Comment: socially    Drug use: Never    Sexual activity: Yes     Partners: Female     Birth control/protection: None, Tubal ligation       Allergies   Allergen Reactions    Shellfish-Derived Products Anaphylaxis     Edema    Iodides Other (See Comments)     Tries to be careful about it with one kidney and sensitivity to shellfish         Current Outpatient Medications:     enalapril (VASOTEC) 10 MG tablet, TAKE 1 TABLET BY MOUTH DAILY., Disp: 30 tablet, Rfl: 5    escitalopram (LEXAPRO) 10 MG tablet, TAKE 1.5 TABLETS BY MOUTH DAILY., Disp: 45 tablet, Rfl: 5    losartan (COZAAR) 100 MG tablet, Take 1 tablet by mouth Daily., Disp: 30 tablet, Rfl: 3    rosuvastatin (CRESTOR) 10 MG tablet, TAKE 1 TABLET BY MOUTH DAILY., Disp: 90 tablet, Rfl: 0    Testosterone 1.62 % gel, APPLY TRANSDERMALY EVERY MORNING., Disp: 75 g, Rfl: 3    Review of Systems   Constitutional: Negative.    Respiratory: Negative.  Negative for chest tightness and shortness of breath.    Cardiovascular: Negative.  Negative for chest pain.   Gastrointestinal:  Negative for abdominal pain,  "blood in stool, constipation and diarrhea.        Vital Signs  Vitals:    12/06/24 1447   BP: 132/80   BP Location: Left arm   Patient Position: Sitting   Cuff Size: Adult   Pulse: 78   Temp: 98.8 °F (37.1 °C)   TempSrc: Infrared   Weight: 125 kg (275 lb)   Height: 174.5 cm (68.7\")   PainSc: 0-No pain       Physical Exam  Vitals reviewed.   Constitutional:       Appearance: Normal appearance. He is well-developed.   HENT:      Head: Normocephalic and atraumatic.   Neck:      Thyroid: No thyromegaly.      Vascular: No carotid bruit.   Cardiovascular:      Rate and Rhythm: Normal rate and regular rhythm.      Heart sounds: Normal heart sounds. No murmur heard.     No friction rub. No gallop.   Pulmonary:      Effort: Pulmonary effort is normal.      Breath sounds: Normal breath sounds.   Musculoskeletal:      Cervical back: Normal range of motion and neck supple.      Right lower leg: No edema.      Left lower leg: No edema.   Lymphadenopathy:      Cervical: No cervical adenopathy.   Skin:     General: Skin is warm and dry.   Neurological:      Mental Status: He is alert and oriented to person, place, and time.      Cranial Nerves: No cranial nerve deficit.   Psychiatric:         Mood and Affect: Mood normal.         Behavior: Behavior normal.        Physical Exam      Procedures    ACE III MINI        Results             Assessment/Plan:    Diagnoses and all orders for this visit:    1. Primary hypertension (Primary)    2. Mixed hyperlipidemia    3. Stage 3a chronic kidney disease    4. Panic disorder without agoraphobia      Assessment & Plan  1. Posterior headaches.  He has known cervical spine disease. There is no evidence of neurologic impairment, indicating the headaches are mechanical. Physical therapy is recommended for a few weeks to reduce the associated muscle spasm.    2. Bilateral chest wall tenderness.  There is no gynecomastia. He will hold his rosuvastatin for the next 4 weeks. If symptoms improve, " pravastatin will be considered.    3. Low testosterone.  He reports no improvement on testosterone replacement, suggesting that his current level may be physiologically normal for him.    4. Hypertension.  Blood pressure is well controlled on losartan and enalapril.    5. 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  GFR is pending. Continue current medications. He avoids NSAIDs.    6. Panic disorder.  Continue escitalopram.    Follow-up  Return in 6 months for follow up.      Plan of care reviewed with patient at the conclusion of today's visit. Education was provided regarding diagnosis, management, and any prescribed or recommended OTC medications.Patient verbalizes understanding of and agreement with management plan.     Patient or patient representative verbalized consent for the use of Ambient Listening during the visit with  Ramana Bautista MD for chart documentation. 12/7/2024  15:01 PRIMO Bautista MD

## 2024-12-09 ENCOUNTER — TELEPHONE (OUTPATIENT)
Dept: INTERNAL MEDICINE | Facility: CLINIC | Age: 44
End: 2024-12-09
Payer: COMMERCIAL

## 2024-12-09 ENCOUNTER — LAB (OUTPATIENT)
Dept: LAB | Facility: HOSPITAL | Age: 44
End: 2024-12-09
Payer: COMMERCIAL

## 2024-12-09 DIAGNOSIS — N18.31 STAGE 3A CHRONIC KIDNEY DISEASE: ICD-10-CM

## 2024-12-09 DIAGNOSIS — R30.0 DYSURIA: Primary | ICD-10-CM

## 2024-12-09 DIAGNOSIS — R30.0 DYSURIA: ICD-10-CM

## 2024-12-09 DIAGNOSIS — N18.31 STAGE 3A CHRONIC KIDNEY DISEASE: Primary | ICD-10-CM

## 2024-12-09 LAB
ALBUMIN UR-MCNC: 20.1 MG/DL
APO B SERPL-MCNC: 113 MG/DL
BACTERIA UR QL AUTO: NORMAL /HPF
BILIRUB UR QL STRIP: NEGATIVE
CLARITY UR: CLEAR
COLOR UR: YELLOW
CREAT UR-MCNC: 82.6 MG/DL
GLUCOSE UR STRIP-MCNC: NEGATIVE MG/DL
HGB UR QL STRIP.AUTO: NEGATIVE
HOLD SPECIMEN: NORMAL
HYALINE CASTS UR QL AUTO: NORMAL /LPF
KETONES UR QL STRIP: NEGATIVE
LEUKOCYTE ESTERASE UR QL STRIP.AUTO: NEGATIVE
MICROALBUMIN/CREAT UR: 243.3 MG/G (ref 0–29)
NITRITE UR QL STRIP: NEGATIVE
PH UR STRIP.AUTO: 6 [PH] (ref 5–8)
PROT UR QL STRIP: ABNORMAL
RBC # UR STRIP: NORMAL /HPF
REF LAB TEST METHOD: NORMAL
SP GR UR STRIP: 1.01 (ref 1–1.03)
SQUAMOUS #/AREA URNS HPF: NORMAL /HPF
UROBILINOGEN UR QL STRIP: ABNORMAL
WBC # UR STRIP: NORMAL /HPF

## 2024-12-09 PROCEDURE — 82043 UR ALBUMIN QUANTITATIVE: CPT

## 2024-12-09 PROCEDURE — 82570 ASSAY OF URINE CREATININE: CPT

## 2024-12-09 PROCEDURE — 81001 URINALYSIS AUTO W/SCOPE: CPT

## 2024-12-09 NOTE — TELEPHONE ENCOUNTER
Advised pt Dr. Bautista responded to the [employer] message, pt will check message and call back if needed.

## 2025-01-13 ENCOUNTER — TELEPHONE (OUTPATIENT)
Dept: INTERNAL MEDICINE | Facility: CLINIC | Age: 45
End: 2025-01-13
Payer: COMMERCIAL

## 2025-01-13 NOTE — TELEPHONE ENCOUNTER
Dr. Blakely's Reply.....    I called and talked to him.  Sounds like a prepatellar bursitis.  He is feeling a little bit better.  No redness or streaking.  He will call me in the morning to let me know how he is doing.  He thinks he is doing okay right now.  The highest his temperature has been was 100.3.   
Dr. Blakely,   Please see message below and advise, thank you.   
I called him today.  He is doing better.  I gave him his lab results.  I ordered Keflex for him.  If he gets worse he will go to ER for IV antibiotics.  He will call us Monday to let us know how he is doing.  Sounds like aseptic prepatellar bursitis.  He is doing better each day.  
PATIENT CALLED ASKING IF HE MAY HAVE PREPATELLAR BURSITIS THAT COULD BE TREATED WITH ANTIBIOTICS. PATIENT WOULD LIKE A CALL BACK -[phone]      
14-Jan-2025

## 2025-01-13 NOTE — TELEPHONE ENCOUNTER
Pharmacy Name:     [facility] - [place], [place] - [facility] [address]  - [phone]  - [phone]      Pharmacy representative name:     CRISTOBAL    What medication are you calling in regards to:       Semaglutide-Weight Management 0.25 MG/0.5ML solution auto-injector     What question does the pharmacy have:     PHARMACY STATED PRESCRIPTION FOR MEDICATION ABOVE WAS FORWARDED TO PHARMACY FOR PATIENT'S SON    PHARMACY REQUESTED A VERBAL FROM DR HILTON CONFIRMING PRESCRIPTION IS FOR PATIENT INSTEAD    Who is the provider that prescribed the medication:     DR HILTON

## 2025-01-17 RX ORDER — ESCITALOPRAM OXALATE 10 MG/1
15 TABLET ORAL DAILY
Qty: 45 TABLET | Refills: 5 | Status: SHIPPED | OUTPATIENT
Start: 2025-01-17

## 2025-02-24 ENCOUNTER — TELEPHONE (OUTPATIENT)
Dept: INTERNAL MEDICINE | Facility: CLINIC | Age: 45
End: 2025-02-24
Payer: COMMERCIAL

## 2025-02-24 DIAGNOSIS — Z11.1 SCREENING FOR TUBERCULOSIS: Primary | ICD-10-CM

## 2025-02-24 DIAGNOSIS — Z02.1 DRUG SCREENING, PRE-EMPLOYMENT: ICD-10-CM

## 2025-02-24 NOTE — TELEPHONE ENCOUNTER
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

## 2025-02-25 ENCOUNTER — CLINICAL SUPPORT (OUTPATIENT)
Dept: INTERNAL MEDICINE | Facility: CLINIC | Age: 45
End: 2025-02-25
Payer: COMMERCIAL

## 2025-02-25 ENCOUNTER — LAB (OUTPATIENT)
Dept: LAB | Facility: HOSPITAL | Age: 45
End: 2025-02-25
Payer: COMMERCIAL

## 2025-02-25 DIAGNOSIS — Z11.1 SCREENING FOR TUBERCULOSIS: ICD-10-CM

## 2025-02-25 DIAGNOSIS — Z02.1 DRUG SCREENING, PRE-EMPLOYMENT: ICD-10-CM

## 2025-02-25 PROCEDURE — 36415 COLL VENOUS BLD VENIPUNCTURE: CPT

## 2025-02-25 PROCEDURE — 86480 TB TEST CELL IMMUN MEASURE: CPT

## 2025-02-27 LAB
GAMMA INTERFERON BACKGROUND BLD IA-ACNC: 0 IU/ML
M TB IFN-G BLD-IMP: NEGATIVE
M TB IFN-G CD4+ BCKGRND COR BLD-ACNC: 0.01 IU/ML
M TB IFN-G CD4+CD8+ BCKGRND COR BLD-ACNC: 0.01 IU/ML
MITOGEN IGNF BCKGRD COR BLD-ACNC: >10 IU/ML
SERVICE CMNT-IMP: NORMAL

## 2025-03-04 ENCOUNTER — TELEPHONE (OUTPATIENT)
Dept: INTERNAL MEDICINE | Facility: CLINIC | Age: 45
End: 2025-03-04

## 2025-03-04 NOTE — TELEPHONE ENCOUNTER
Patient verbalized understanding. When results are scanned in please send to patients mychart.       Thank you!

## 2025-03-04 NOTE — TELEPHONE ENCOUNTER
Other than the couple of beers he had the night before, it's negative.  We can send him a copy if he needs it.

## 2025-03-04 NOTE — TELEPHONE ENCOUNTER
Caller: Fili Mcclellan    Relationship: Self    Best call back number:       [phone] (Mobile)     Caller requesting test results:     PATIENT    What test was performed:     URINE DRUG TEST    When was the test performed:     TUESDAY, 2/25    Where was the test performed:     OFFICE    Additional notes:     PATIENT STATED HE HAS NOT RECEIVED RESULTS FROM THIS TEST ON HIS [employer] ACCOUNT AS OF TODAY, 3/4    PATIENT REQUESTED A CALL BACK WHEN TEST RESULTS HAVE ARRIVED

## 2025-03-11 DIAGNOSIS — Z02.1 DRUG SCREENING, PRE-EMPLOYMENT: Primary | ICD-10-CM

## 2025-03-13 ENCOUNTER — LAB (OUTPATIENT)
Dept: LAB | Facility: HOSPITAL | Age: 45
End: 2025-03-13
Payer: COMMERCIAL

## 2025-03-13 DIAGNOSIS — Z02.1 DRUG SCREENING, PRE-EMPLOYMENT: ICD-10-CM

## 2025-03-13 LAB
AMPHET+METHAMPHET UR QL: NEGATIVE
AMPHETAMINES UR QL: NEGATIVE
BARBITURATES UR QL SCN: NEGATIVE
BENZODIAZ UR QL SCN: NEGATIVE
BUPRENORPHINE SERPL-MCNC: NEGATIVE NG/ML
CANNABINOIDS SERPL QL: NEGATIVE
COCAINE UR QL: NEGATIVE
FENTANYL UR-MCNC: NEGATIVE NG/ML
METHADONE UR QL SCN: NEGATIVE
OPIATES UR QL: NEGATIVE
OXYCODONE UR QL SCN: NEGATIVE
PCP UR QL SCN: NEGATIVE
TRICYCLICS UR QL SCN: NEGATIVE

## 2025-03-13 PROCEDURE — 80307 DRUG TEST PRSMV CHEM ANLYZR: CPT

## 2025-03-28 ENCOUNTER — LAB (OUTPATIENT)
Dept: LAB | Facility: HOSPITAL | Age: 45
End: 2025-03-28
Payer: COMMERCIAL

## 2025-03-28 ENCOUNTER — TRANSCRIBE ORDERS (OUTPATIENT)
Dept: LAB | Facility: HOSPITAL | Age: 45
End: 2025-03-28
Payer: COMMERCIAL

## 2025-03-28 DIAGNOSIS — R80.9 PROTEINURIA, UNSPECIFIED TYPE: Primary | ICD-10-CM

## 2025-03-28 DIAGNOSIS — R80.9 PROTEINURIA, UNSPECIFIED TYPE: ICD-10-CM

## 2025-03-28 LAB
ANION GAP SERPL CALCULATED.3IONS-SCNC: 12 MMOL/L (ref 5–15)
BUN SERPL-MCNC: 16 MG/DL (ref 6–20)
BUN/CREAT SERPL: 17.6 (ref 7–25)
CALCIUM SPEC-SCNC: 9.8 MG/DL (ref 8.6–10.5)
CHLORIDE SERPL-SCNC: 96 MMOL/L (ref 98–107)
CO2 SERPL-SCNC: 25 MMOL/L (ref 22–29)
CREAT SERPL-MCNC: 0.91 MG/DL (ref 0.76–1.27)
EGFRCR SERPLBLD CKD-EPI 2021: 105.3 ML/MIN/1.73
GLUCOSE SERPL-MCNC: 105 MG/DL (ref 65–99)
POTASSIUM SERPL-SCNC: 4.2 MMOL/L (ref 3.5–5.2)
SODIUM SERPL-SCNC: 133 MMOL/L (ref 136–145)

## 2025-03-28 PROCEDURE — 80048 BASIC METABOLIC PNL TOTAL CA: CPT

## 2025-03-28 PROCEDURE — 81001 URINALYSIS AUTO W/SCOPE: CPT | Performed by: INTERNAL MEDICINE

## 2025-03-28 PROCEDURE — 36415 COLL VENOUS BLD VENIPUNCTURE: CPT

## 2025-03-28 PROCEDURE — 82570 ASSAY OF URINE CREATININE: CPT

## 2025-03-28 PROCEDURE — 84156 ASSAY OF PROTEIN URINE: CPT

## 2025-03-29 LAB
BACTERIA UR QL AUTO: NORMAL /HPF
BILIRUB UR QL STRIP: NEGATIVE
CLARITY UR: CLEAR
COLOR UR: YELLOW
CREAT UR-MCNC: 62.4 MG/DL
GLUCOSE UR STRIP-MCNC: NEGATIVE MG/DL
HGB UR QL STRIP.AUTO: NEGATIVE
HYALINE CASTS UR QL AUTO: NORMAL /LPF
KETONES UR QL STRIP: NEGATIVE
LEUKOCYTE ESTERASE UR QL STRIP.AUTO: NEGATIVE
NITRITE UR QL STRIP: NEGATIVE
PH UR STRIP.AUTO: 6 [PH] (ref 5–8)
PROT ?TM UR-MCNC: 27 MG/DL
PROT UR QL STRIP: ABNORMAL
RBC # UR STRIP: NORMAL /HPF
REF LAB TEST METHOD: NORMAL
SP GR UR STRIP: 1.01 (ref 1–1.03)
SQUAMOUS #/AREA URNS HPF: NORMAL /HPF
UROBILINOGEN UR QL STRIP: ABNORMAL
WBC # UR STRIP: NORMAL /HPF

## 2025-07-09 RX ORDER — ESCITALOPRAM OXALATE 10 MG/1
15 TABLET ORAL DAILY
Qty: 45 TABLET | Refills: 5 | Status: SHIPPED | OUTPATIENT
Start: 2025-07-09